# Patient Record
Sex: FEMALE | Race: WHITE | NOT HISPANIC OR LATINO | Employment: FULL TIME | URBAN - METROPOLITAN AREA
[De-identification: names, ages, dates, MRNs, and addresses within clinical notes are randomized per-mention and may not be internally consistent; named-entity substitution may affect disease eponyms.]

---

## 2017-01-31 ENCOUNTER — COMMUNICATION - HEALTHEAST (OUTPATIENT)
Dept: FAMILY MEDICINE | Facility: CLINIC | Age: 27
End: 2017-01-31

## 2017-04-24 ENCOUNTER — COMMUNICATION - HEALTHEAST (OUTPATIENT)
Dept: FAMILY MEDICINE | Facility: CLINIC | Age: 27
End: 2017-04-24

## 2017-06-12 ENCOUNTER — OFFICE VISIT - HEALTHEAST (OUTPATIENT)
Dept: FAMILY MEDICINE | Facility: CLINIC | Age: 27
End: 2017-06-12

## 2017-06-12 DIAGNOSIS — Z00.00 ROUTINE GENERAL MEDICAL EXAMINATION AT A HEALTH CARE FACILITY: ICD-10-CM

## 2017-06-12 DIAGNOSIS — S29.011A STRAIN OF CHEST WALL, INITIAL ENCOUNTER: ICD-10-CM

## 2017-06-12 ASSESSMENT — MIFFLIN-ST. JEOR: SCORE: 1376.21

## 2017-06-13 ENCOUNTER — COMMUNICATION - HEALTHEAST (OUTPATIENT)
Dept: FAMILY MEDICINE | Facility: CLINIC | Age: 27
End: 2017-06-13

## 2017-06-20 LAB
BKR LAB AP ABNORMAL BLEEDING: NO
BKR LAB AP BIRTH CONTROL/HORMONES: NORMAL
BKR LAB AP CERVICAL APPEARANCE: NORMAL
BKR LAB AP GYN ADEQUACY: NORMAL
BKR LAB AP GYN INTERPRETATION: NORMAL
BKR LAB AP HPV REFLEX: NORMAL
BKR LAB AP LMP: NORMAL
BKR LAB AP PATIENT STATUS: NORMAL
BKR LAB AP PREVIOUS ABNORMAL: NO
BKR LAB AP PREVIOUS NORMAL: 2014
HIGH RISK?: NO
PATH REPORT.COMMENTS IMP SPEC: NORMAL
RESULT FLAG (HE HISTORICAL CONVERSION): NORMAL

## 2017-06-30 ENCOUNTER — COMMUNICATION - HEALTHEAST (OUTPATIENT)
Dept: FAMILY MEDICINE | Facility: CLINIC | Age: 27
End: 2017-06-30

## 2017-11-08 ENCOUNTER — COMMUNICATION - HEALTHEAST (OUTPATIENT)
Dept: FAMILY MEDICINE | Facility: CLINIC | Age: 27
End: 2017-11-08

## 2017-11-30 ENCOUNTER — OFFICE VISIT - HEALTHEAST (OUTPATIENT)
Dept: FAMILY MEDICINE | Facility: CLINIC | Age: 27
End: 2017-11-30

## 2017-11-30 DIAGNOSIS — L29.0 ANAL ITCHING: ICD-10-CM

## 2018-01-09 ENCOUNTER — COMMUNICATION - HEALTHEAST (OUTPATIENT)
Dept: FAMILY MEDICINE | Facility: CLINIC | Age: 28
End: 2018-01-09

## 2018-01-29 ENCOUNTER — COMMUNICATION - HEALTHEAST (OUTPATIENT)
Dept: FAMILY MEDICINE | Facility: CLINIC | Age: 28
End: 2018-01-29

## 2018-02-06 ENCOUNTER — COMMUNICATION - HEALTHEAST (OUTPATIENT)
Dept: FAMILY MEDICINE | Facility: CLINIC | Age: 28
End: 2018-02-06

## 2018-02-12 ENCOUNTER — OFFICE VISIT - HEALTHEAST (OUTPATIENT)
Dept: FAMILY MEDICINE | Facility: CLINIC | Age: 28
End: 2018-02-12

## 2018-02-12 DIAGNOSIS — R39.89 SUSPECTED UTI: ICD-10-CM

## 2018-06-22 ENCOUNTER — COMMUNICATION - HEALTHEAST (OUTPATIENT)
Dept: FAMILY MEDICINE | Facility: CLINIC | Age: 28
End: 2018-06-22

## 2018-08-29 ENCOUNTER — OFFICE VISIT - HEALTHEAST (OUTPATIENT)
Dept: FAMILY MEDICINE | Facility: CLINIC | Age: 28
End: 2018-08-29

## 2018-08-29 DIAGNOSIS — Z30.8 ENCOUNTER FOR OTHER CONTRACEPTIVE MANAGEMENT: ICD-10-CM

## 2018-08-29 DIAGNOSIS — Z00.00 ROUTINE PHYSICAL EXAMINATION: ICD-10-CM

## 2018-08-29 ASSESSMENT — MIFFLIN-ST. JEOR: SCORE: 1284.47

## 2019-01-04 ENCOUNTER — OFFICE VISIT - HEALTHEAST (OUTPATIENT)
Dept: FAMILY MEDICINE | Facility: CLINIC | Age: 29
End: 2019-01-04

## 2019-01-04 DIAGNOSIS — R10.13 ABDOMINAL PAIN, EPIGASTRIC: ICD-10-CM

## 2019-01-04 ASSESSMENT — MIFFLIN-ST. JEOR: SCORE: 1308.06

## 2019-01-15 ENCOUNTER — COMMUNICATION - HEALTHEAST (OUTPATIENT)
Dept: FAMILY MEDICINE | Facility: CLINIC | Age: 29
End: 2019-01-15

## 2019-01-15 DIAGNOSIS — R14.0 FLATULENCE/GAS PAIN/BELCHING: ICD-10-CM

## 2019-01-15 DIAGNOSIS — R10.13 ABDOMINAL PAIN, EPIGASTRIC: ICD-10-CM

## 2019-02-12 ENCOUNTER — COMMUNICATION - HEALTHEAST (OUTPATIENT)
Dept: FAMILY MEDICINE | Facility: CLINIC | Age: 29
End: 2019-02-12

## 2019-03-05 ENCOUNTER — COMMUNICATION - HEALTHEAST (OUTPATIENT)
Dept: SCHEDULING | Facility: CLINIC | Age: 29
End: 2019-03-05

## 2019-12-02 ENCOUNTER — OFFICE VISIT - HEALTHEAST (OUTPATIENT)
Dept: FAMILY MEDICINE | Facility: CLINIC | Age: 29
End: 2019-12-02

## 2019-12-02 DIAGNOSIS — M25.562 LEFT KNEE PAIN, UNSPECIFIED CHRONICITY: ICD-10-CM

## 2019-12-02 DIAGNOSIS — Z00.00 ROUTINE GENERAL MEDICAL EXAMINATION AT A HEALTH CARE FACILITY: ICD-10-CM

## 2019-12-02 ASSESSMENT — MIFFLIN-ST. JEOR: SCORE: 1338.91

## 2020-01-27 ENCOUNTER — COMMUNICATION - HEALTHEAST (OUTPATIENT)
Dept: FAMILY MEDICINE | Facility: CLINIC | Age: 30
End: 2020-01-27

## 2020-01-27 DIAGNOSIS — M25.562 LEFT KNEE PAIN, UNSPECIFIED CHRONICITY: ICD-10-CM

## 2020-01-27 DIAGNOSIS — M25.552 HIP PAIN, LEFT: ICD-10-CM

## 2020-02-05 ENCOUNTER — OFFICE VISIT - HEALTHEAST (OUTPATIENT)
Dept: PHYSICAL THERAPY | Facility: REHABILITATION | Age: 30
End: 2020-02-05

## 2020-02-05 DIAGNOSIS — M25.562 LEFT LATERAL KNEE PAIN: ICD-10-CM

## 2020-02-05 DIAGNOSIS — M76.892 HIP FLEXOR TENDONITIS, LEFT: ICD-10-CM

## 2020-02-06 ENCOUNTER — COMMUNICATION - HEALTHEAST (OUTPATIENT)
Dept: FAMILY MEDICINE | Facility: CLINIC | Age: 30
End: 2020-02-06

## 2020-02-06 ENCOUNTER — COMMUNICATION - HEALTHEAST (OUTPATIENT)
Dept: PHYSICAL THERAPY | Facility: REHABILITATION | Age: 30
End: 2020-02-06

## 2020-02-14 ENCOUNTER — OFFICE VISIT - HEALTHEAST (OUTPATIENT)
Dept: PHYSICAL THERAPY | Facility: REHABILITATION | Age: 30
End: 2020-02-14

## 2020-02-14 DIAGNOSIS — M76.892 HIP FLEXOR TENDONITIS, LEFT: ICD-10-CM

## 2020-02-14 DIAGNOSIS — M25.562 LEFT LATERAL KNEE PAIN: ICD-10-CM

## 2020-02-14 DIAGNOSIS — F41.1 GENERALIZED ANXIETY DISORDER: ICD-10-CM

## 2020-02-14 DIAGNOSIS — N92.6 IRREGULAR MENSTRUAL CYCLE: ICD-10-CM

## 2020-02-21 ENCOUNTER — OFFICE VISIT - HEALTHEAST (OUTPATIENT)
Dept: PHYSICAL THERAPY | Facility: REHABILITATION | Age: 30
End: 2020-02-21

## 2020-02-21 DIAGNOSIS — M25.562 LEFT LATERAL KNEE PAIN: ICD-10-CM

## 2020-02-21 DIAGNOSIS — F41.1 GENERALIZED ANXIETY DISORDER: ICD-10-CM

## 2020-02-21 DIAGNOSIS — M76.892 HIP FLEXOR TENDONITIS, LEFT: ICD-10-CM

## 2020-02-28 ENCOUNTER — OFFICE VISIT - HEALTHEAST (OUTPATIENT)
Dept: PHYSICAL THERAPY | Facility: REHABILITATION | Age: 30
End: 2020-02-28

## 2020-02-28 DIAGNOSIS — F41.1 GENERALIZED ANXIETY DISORDER: ICD-10-CM

## 2020-02-28 DIAGNOSIS — M76.892 HIP FLEXOR TENDONITIS, LEFT: ICD-10-CM

## 2020-02-28 DIAGNOSIS — M25.562 LEFT LATERAL KNEE PAIN: ICD-10-CM

## 2020-03-06 ENCOUNTER — OFFICE VISIT - HEALTHEAST (OUTPATIENT)
Dept: PHYSICAL THERAPY | Facility: REHABILITATION | Age: 30
End: 2020-03-06

## 2020-03-06 DIAGNOSIS — M25.562 LEFT LATERAL KNEE PAIN: ICD-10-CM

## 2020-03-06 DIAGNOSIS — M76.892 HIP FLEXOR TENDONITIS, LEFT: ICD-10-CM

## 2020-03-09 ENCOUNTER — COMMUNICATION - HEALTHEAST (OUTPATIENT)
Dept: FAMILY MEDICINE | Facility: CLINIC | Age: 30
End: 2020-03-09

## 2020-03-09 DIAGNOSIS — N92.6 IRREGULAR MENSTRUAL CYCLE: ICD-10-CM

## 2020-12-15 ENCOUNTER — OFFICE VISIT (OUTPATIENT)
Dept: FAMILY MEDICINE | Facility: CLINIC | Age: 30
End: 2020-12-15
Payer: COMMERCIAL

## 2020-12-15 VITALS
OXYGEN SATURATION: 99 % | RESPIRATION RATE: 16 BRPM | HEART RATE: 120 BPM | HEIGHT: 62 IN | DIASTOLIC BLOOD PRESSURE: 85 MMHG | SYSTOLIC BLOOD PRESSURE: 131 MMHG | TEMPERATURE: 97.9 F | WEIGHT: 156.4 LBS | BODY MASS INDEX: 28.78 KG/M2

## 2020-12-15 DIAGNOSIS — Z32.01 PREGNANCY EXAMINATION OR TEST, POSITIVE RESULT: Primary | ICD-10-CM

## 2020-12-15 LAB — HCG UR QL: POSITIVE

## 2020-12-15 PROCEDURE — 99203 OFFICE O/P NEW LOW 30 MIN: CPT | Performed by: NURSE PRACTITIONER

## 2020-12-15 PROCEDURE — 81025 URINE PREGNANCY TEST: CPT | Performed by: NURSE PRACTITIONER

## 2020-12-15 ASSESSMENT — MIFFLIN-ST. JEOR: SCORE: 1384.36

## 2020-12-15 NOTE — PROGRESS NOTES
"Subjective     Myriam Flower is a 30 year old female who presents to clinic today for the following health issues:    HPI         Patient is here for a pregnancy test- current pregnancy symptoms- breast tenderness, dull cramping, acne, tired easily.  On prenatal vitamin.  No smoking or current ETOH use.   LMP: 11/7/2020  Test done at home : yes  Result: positive on  12/12/2020      Review of Systems   Constitutional, HEENT, cardiovascular, pulmonary, GI, , musculoskeletal, neuro, skin, endocrine and psych systems are negative, except as otherwise noted.      Objective    /85   Pulse 120   Temp 97.9  F (36.6  C) (Tympanic)   Resp 16   Ht 1.578 m (5' 2.11\")   Wt 70.9 kg (156 lb 6.4 oz)   LMP 11/07/2020 (Approximate)   SpO2 99%   Breastfeeding No   BMI 28.51 kg/m    Body mass index is 28.51 kg/m .  Physical Exam   GENERAL: healthy, alert and no distress  RESP: lungs clear to auscultation - no rales, rhonchi or wheezes  CV: regular rate and rhythm, normal S1 S2, no S3 or S4, no murmur, click or rub, no peripheral edema and peripheral pulses strong  PSYCH: mentation appears normal, affect normal/bright    See orders- UPT positive        Assessment & Plan     Myriam was seen today for pregnancy test.    Diagnoses and all orders for this visit:    Pregnancy examination or test, positive result  -     HCG Qual, Urine ([id number])  -     OB/GYN REFERRAL         BMI:   Estimated body mass index is 28.51 kg/m  as calculated from the following:    Height as of this encounter: 1.578 m (5' 2.11\").    Weight as of this encounter: 70.9 kg (156 lb 6.4 oz).   Weight management plan: Discussed healthy diet and exercise guidelines         See Patient Instructions: discussed schedule with OBGYN.  Follow up as needed for any health care questions or concerns.     Return in about 4 weeks (around 1/12/2021), or if symptoms worsen or fail to improve.    CATALINO Acosta  [facility]    "

## 2020-12-15 NOTE — PATIENT INSTRUCTIONS
Patient Education     Pregnancy    Your exam today shows that you are pregnant.  Pregnancy symptoms  During pregnancy your body s hormones change. This causes physical and emotional changes. This is normal. Knowing what to expect is important for your piece of mind and so you know when to seek help for a problem. Here are some of the most common symptoms:     Morning sickness or nausea. This can happen any time of the day or night.    Tender, swollen breasts    Need to urinate frequently    Tiredness or fatigue    Dizziness    Indigestion or heartburn    Food cravings or turn-offs    Constipation    Emotional changes. This can range from anxiety to excitement to depression.  General care for a healthy pregnancy  Here are things you can do to help make sure your baby is born healthy:    Rest when you feel tired. This is especially true in the later months of pregnancy.    Drink more fluids. Your body needs more fluids than you may be used to. Drink 8 to10 glasses of juice, milk, or water every day.    Eat well-balanced meals. Eat at regular times to give your body enough protein. You can expect to gain about 30 pounds during the pregnancy. Don t try to diet or lose weight while you are pregnant.    Take a prenatal vitamin every day. This helps you meet the extra nutritional needs of pregnancy.    Don t take any other medicine during your pregnancy unless your healthcare provider tells you to. This includes prescription medicines and those you buy over the counter. Many medicines can harm the growing baby.    If you have nausea or vomiting, don t eat greasy or fried foods. Eat several smaller meals throughout the day rather than 3 large meals.    If you smoke, you must stop. The nicotine you breathe in goes right to the baby.    Stay away from alcohol, even in moderate amounts. Daily drinking will harm your baby and can cause permanent brain damage.    Don t use recreational drugs, especially cocaine, crack, and  heroin. These will harm your baby. Also avoid marijuana.    If you were using recreational drugs or prescribed medicine when you found out that you were pregnant, talk with your healthcare provider about possible effects on your growing baby.    If you have medical problems that you need to take medicine for, talk with your healthcare provider.  Follow-up care  Call your healthcare provider to arrange for prenatal care. Prenatal care is important. You can see your family provider, a pregnancy specialist (obstetrician), a midwife, or a primary care clinic.   When to seek medical advice  Call your healthcare provider right away if any of these occur:    Vaginal bleeding    Pain in your belly (abdomen) or back that is moderate or severe    Lots of vomiting, or you can t keep any fluids down for 6 hours    Burning feeling when you urinate    Headache, dizziness, or rapid weight gain    Fever    Vision changes or blurred vision  Fred[employer] last reviewed this educational content on 11/1/2019 2000-2020 [email or web address]. [address] [place] [address], [place] [address]. All rights reserved. This information is not intended as a substitute for professional medical care. Always follow your healthcare professional's instructions.

## 2020-12-16 NOTE — RESULT ENCOUNTER NOTE
Results discussed directly with patient while patient was present. Any further details documented in the note.   Lu Cruz NP

## 2020-12-30 ENCOUNTER — MYC MEDICAL ADVICE (OUTPATIENT)
Dept: FAMILY MEDICINE | Facility: CLINIC | Age: 30
End: 2020-12-30

## 2021-01-04 ENCOUNTER — PRENATAL OFFICE VISIT (OUTPATIENT)
Dept: OBGYN | Facility: CLINIC | Age: 31
End: 2021-01-04
Payer: COMMERCIAL

## 2021-01-04 VITALS
WEIGHT: 157 LBS | HEIGHT: 63 IN | DIASTOLIC BLOOD PRESSURE: 83 MMHG | OXYGEN SATURATION: 100 % | HEART RATE: 96 BPM | SYSTOLIC BLOOD PRESSURE: 122 MMHG | BODY MASS INDEX: 27.82 KG/M2

## 2021-01-04 DIAGNOSIS — Z34.01 SUPERVISION OF NORMAL FIRST PREGNANCY IN FIRST TRIMESTER: Primary | ICD-10-CM

## 2021-01-04 DIAGNOSIS — Z12.4 SCREENING FOR CERVICAL CANCER: ICD-10-CM

## 2021-01-04 DIAGNOSIS — B37.31 YEAST INFECTION OF THE VAGINA: Primary | ICD-10-CM

## 2021-01-04 DIAGNOSIS — Z36.87 UNSURE OF LMP (LAST MENSTRUAL PERIOD) AS REASON FOR ULTRASOUND SCAN: ICD-10-CM

## 2021-01-04 DIAGNOSIS — N89.8 VAGINAL DISCHARGE: ICD-10-CM

## 2021-01-04 LAB
ABORH_EXT (HISTORICAL CONVERSION): NORMAL
ANTIBODY_EXT (HISTORICAL CONVERSION): NEGATIVE
ERYTHROCYTE [DISTWIDTH] IN BLOOD BY AUTOMATED COUNT: 11.3 % (ref 10–15)
HBSAG_EXT (HISTORICAL CONVERSION): NORMAL
HCT VFR BLD AUTO: 39.8 % (ref 35–47)
HGB BLD-MCNC: 14.2 G/DL (ref 11.7–15.7)
HGB_EXT (HISTORICAL CONVERSION): 14.2
HIV_EXT: NORMAL
MCH RBC QN AUTO: 30.8 PG (ref 26.5–33)
MCHC RBC AUTO-ENTMCNC: 35.7 G/DL (ref 31.5–36.5)
MCV RBC AUTO: 86 FL (ref 78–100)
PLATELET # BLD AUTO: 301 10E9/L (ref 150–450)
PLT_EXT - HISTORICAL: 301
RBC # BLD AUTO: 4.61 10E12/L (ref 3.8–5.2)
RPR - HISTORICAL: NORMAL
RUBELLA_EXT (HISTORICAL CONVERSION): NORMAL
SPECIMEN SOURCE: ABNORMAL
WBC # BLD AUTO: 13.5 10E9/L (ref 4–11)
WET PREP SPEC: ABNORMAL

## 2021-01-04 PROCEDURE — 87086 URINE CULTURE/COLONY COUNT: CPT | Performed by: OBSTETRICS & GYNECOLOGY

## 2021-01-04 PROCEDURE — 86901 BLOOD TYPING SEROLOGIC RH(D): CPT | Performed by: OBSTETRICS & GYNECOLOGY

## 2021-01-04 PROCEDURE — 99207 PR FIRST OB VISIT: CPT | Performed by: OBSTETRICS & GYNECOLOGY

## 2021-01-04 PROCEDURE — G0145 SCR C/V CYTO,THINLAYER,RESCR: HCPCS | Performed by: OBSTETRICS & GYNECOLOGY

## 2021-01-04 PROCEDURE — 86780 TREPONEMA PALLIDUM: CPT | Performed by: OBSTETRICS & GYNECOLOGY

## 2021-01-04 PROCEDURE — 87624 HPV HI-RISK TYP POOLED RSLT: CPT | Performed by: OBSTETRICS & GYNECOLOGY

## 2021-01-04 PROCEDURE — 36415 COLL VENOUS BLD VENIPUNCTURE: CPT | Performed by: OBSTETRICS & GYNECOLOGY

## 2021-01-04 PROCEDURE — 87340 HEPATITIS B SURFACE AG IA: CPT | Performed by: OBSTETRICS & GYNECOLOGY

## 2021-01-04 PROCEDURE — 86900 BLOOD TYPING SEROLOGIC ABO: CPT | Performed by: OBSTETRICS & GYNECOLOGY

## 2021-01-04 PROCEDURE — 85027 COMPLETE CBC AUTOMATED: CPT | Performed by: OBSTETRICS & GYNECOLOGY

## 2021-01-04 PROCEDURE — 99000 SPECIMEN HANDLING OFFICE-LAB: CPT | Performed by: OBSTETRICS & GYNECOLOGY

## 2021-01-04 PROCEDURE — 87210 SMEAR WET MOUNT SALINE/INK: CPT | Performed by: OBSTETRICS & GYNECOLOGY

## 2021-01-04 PROCEDURE — 87591 N.GONORRHOEAE DNA AMP PROB: CPT | Performed by: OBSTETRICS & GYNECOLOGY

## 2021-01-04 PROCEDURE — 86762 RUBELLA ANTIBODY: CPT | Performed by: OBSTETRICS & GYNECOLOGY

## 2021-01-04 PROCEDURE — 87389 HIV-1 AG W/HIV-1&-2 AB AG IA: CPT | Performed by: OBSTETRICS & GYNECOLOGY

## 2021-01-04 PROCEDURE — 87491 CHLMYD TRACH DNA AMP PROBE: CPT | Performed by: OBSTETRICS & GYNECOLOGY

## 2021-01-04 RX ORDER — TERCONAZOLE 0.4 %
1 CREAM WITH APPLICATOR VAGINAL AT BEDTIME
Qty: 1 G | Refills: 0 | Status: SHIPPED | OUTPATIENT
Start: 2021-01-04 | End: 2021-01-11

## 2021-01-04 ASSESSMENT — MIFFLIN-ST. JEOR: SCORE: 1393.34

## 2021-01-04 ASSESSMENT — PATIENT HEALTH QUESTIONNAIRE - PHQ9: SUM OF ALL RESPONSES TO PHQ QUESTIONS 1-9: 6

## 2021-01-04 NOTE — PATIENT INSTRUCTIONS
If you have any questions regarding your visit, Please contact your care team.    [facility]: [phone]      [facility] CLINIC HOURS TELEPHONE NUMBER   Keiry Busch .    LAURI Singh -  Stacey -       ANIRUDH Medina, RN  Pilar, RN     Monday, Wednesday, Thursday and Friday, [place]  8:30a.m-5:00 p.m   [facility]  [address] [place] [address]  [phone] ask for [facility]    Imaging [phone]       Urgent Care locations:    [facility] Saturday and Sunday   9 am - 5 pm    Monday-Friday   12 pm - 8 pm  Saturday and Sunday   9 am - 5 pm   [phone]     [facility] Labor and Delivery:  [phone]    If you need a medication refill, please contact your pharmacy. Please allow 3 business days for your refill to be completed.  As always, Thank you for trusting us with your healthcare needs!

## 2021-01-04 NOTE — PROGRESS NOTES
"Hali is a 30 year old female, ,who is here today for her first OB visit at 8 2/7 weeks gestation and is new to the  OB dept.  She has had a positive home pregnancy test.  She is not sure of her LMP - could be \"days\" off she states.  Therefore, will verify her EDC with an early OB US and she is comfortable with this plan.  She is due for a pap and consents to labs including a GC and chlamydia screen.  She has not experienced any nausea but has lost some of her appetite.  She denies any vaginal spotting or uterine cramping.  She quit use of all alcohol but had been adding rum to her egg nog before she knew that she was pregnant.  She denies any known alcohol exposure to this pregnancy, however.  She is taking a PNV daily po and her social hx is negative.     ROS: Ten point review of systems was reviewed and negative except the above.    Gyn Hx:      History reviewed. No pertinent past medical history.  History reviewed. No pertinent surgical history.  There is no problem list on file for this patient.      ALL/Meds: Her medication and allergy histories were reviewed and are documented in their appropriate chart areas.    SH: Reviewed and documented in the appropriate area of the chart.    FH: Her family history was reviewed and documented in its appropriate chart area.    PE: /83 (BP Location: Right arm, Cuff Size: Adult Regular)   Pulse 96   Ht 1.588 m (5' 2.5\")   Wt 71.2 kg (157 lb)   LMP 2020 (Approximate)   SpO2 100%   BMI 28.26 kg/m    Body mass index is 28.26 kg/m .    Urine HCG was +  Neck - supple without palpable mass  Hrt - RRR without murmur  Breasts - no palpable mass or nipple discharge but tender to palpation  Abd - soft, gravid, unable to hear fhts with the doppler, nontender  Pelvic - normal external female genitalia, normal vaginal mucosa, closed cervix without motion tenderness, gravid nontender uterus, no adnexal mass or tenderness  Ext - negative    A/P:   - I discussed " with her nutrition and medication concerns related to pregnancy.  We discussed folic acid supplementation.  We reviewed prenatal care.  She is given the opportunity to ask questions and have them answered.  Schedule an OB US and verify dates and fetal viability.  Check prenatal labwork.  She is to continue taking a PNV daily and avoid all alcohol.    30 minutes were spent face to face with the patient today discussing her history, diagnosis, and follow-up plan as noted above.  Over 50% of the visit was spent in counseling and coordination of care.  She is to return in 4 weeks or earlier prn.    Total Visit Time: 30 minutes.     Orders Placed This Encounter   Procedures     US OB <14 Weeks w Transvaginal Single     Pap imaged thin layer screen with HPV - recommended age 30 - 65 years (select HPV order below)     HPV High Risk Types DNA Cervical     CBC with platelets     Rubella Antibody IgG Quantitative     HIV Antigen Antibody Combo     Hepatitis B surface antigen     Treponema Abs w Reflex to RPR and Titer     ABO and Rh     Keiry Busch, DO  FACOG, FACS

## 2021-01-05 ENCOUNTER — ANCILLARY PROCEDURE (OUTPATIENT)
Dept: ULTRASOUND IMAGING | Facility: CLINIC | Age: 31
End: 2021-01-05
Attending: OBSTETRICS & GYNECOLOGY
Payer: COMMERCIAL

## 2021-01-05 DIAGNOSIS — Z36.87 UNSURE OF LMP (LAST MENSTRUAL PERIOD) AS REASON FOR ULTRASOUND SCAN: ICD-10-CM

## 2021-01-05 LAB
ABO + RH BLD: NORMAL
ABO + RH BLD: NORMAL
BACTERIA SPEC CULT: NO GROWTH
C TRACH DNA SPEC QL NAA+PROBE: NEGATIVE
HBV SURFACE AG SERPL QL IA: NONREACTIVE
HIV 1+2 AB+HIV1 P24 AG SERPL QL IA: NONREACTIVE
Lab: NORMAL
N GONORRHOEA DNA SPEC QL NAA+PROBE: NEGATIVE
RUBV IGG SERPL IA-ACNC: 102 IU/ML
SPECIMEN EXP DATE BLD: NORMAL
SPECIMEN SOURCE: NORMAL
T PALLIDUM AB SER QL: NONREACTIVE

## 2021-01-05 PROCEDURE — 76801 OB US < 14 WKS SINGLE FETUS: CPT | Performed by: RADIOLOGY

## 2021-01-06 LAB
COPATH REPORT: NORMAL
PAP: NORMAL

## 2021-01-07 LAB
FINAL DIAGNOSIS: NORMAL
HPV HR 12 DNA CVX QL NAA+PROBE: NEGATIVE
HPV16 DNA SPEC QL NAA+PROBE: NEGATIVE
HPV18 DNA SPEC QL NAA+PROBE: NEGATIVE
SPECIMEN DESCRIPTION: NORMAL
SPECIMEN SOURCE CVX/VAG CYTO: NORMAL

## 2021-01-15 ENCOUNTER — HEALTH MAINTENANCE LETTER (OUTPATIENT)
Age: 31
End: 2021-01-15

## 2021-02-01 ENCOUNTER — PRENATAL OFFICE VISIT (OUTPATIENT)
Dept: OBGYN | Facility: CLINIC | Age: 31
End: 2021-02-01
Payer: COMMERCIAL

## 2021-02-01 VITALS
SYSTOLIC BLOOD PRESSURE: 129 MMHG | DIASTOLIC BLOOD PRESSURE: 83 MMHG | OXYGEN SATURATION: 100 % | HEART RATE: 102 BPM | WEIGHT: 159.1 LBS | BODY MASS INDEX: 28.64 KG/M2

## 2021-02-01 DIAGNOSIS — Z34.02 ENCOUNTER FOR SUPERVISION OF NORMAL FIRST PREGNANCY IN SECOND TRIMESTER: ICD-10-CM

## 2021-02-01 DIAGNOSIS — Z23 NEED FOR PROPHYLACTIC VACCINATION AND INOCULATION AGAINST INFLUENZA: Primary | ICD-10-CM

## 2021-02-01 PROBLEM — Z34.00 SUPERVISION OF NORMAL FIRST PREGNANCY: Status: ACTIVE | Noted: 2021-02-01

## 2021-02-01 PROCEDURE — 99207 PR PRENATAL VISIT: CPT | Performed by: OBSTETRICS & GYNECOLOGY

## 2021-02-01 PROCEDURE — 90686 IIV4 VACC NO PRSV 0.5 ML IM: CPT | Performed by: OBSTETRICS & GYNECOLOGY

## 2021-02-01 PROCEDURE — 90471 IMMUNIZATION ADMIN: CPT | Performed by: OBSTETRICS & GYNECOLOGY

## 2021-02-01 NOTE — PROGRESS NOTES
Presents for routine  appointment.     No complaints aside from fatigue.    No abnormal discharge , no leaking fluid , no contractions , no vaginal bleeding    ROS:   and GI  negative.     Please see Prenatal Vitals and Notes Flowsheet for objective data.    A/P:  30 year old  at 12w2d       ICD-10-CM    1. Encounter for supervision of normal first pregnancy in second trimester  Z34.02        Genetic screening - undecided. Considering quad.    Follow up in 4 weeks.      Samantha Francis MD

## 2021-02-17 ENCOUNTER — MYC MEDICAL ADVICE (OUTPATIENT)
Dept: OBGYN | Facility: CLINIC | Age: 31
End: 2021-02-17

## 2021-03-02 ENCOUNTER — PRENATAL OFFICE VISIT (OUTPATIENT)
Dept: OBGYN | Facility: CLINIC | Age: 31
End: 2021-03-02
Payer: COMMERCIAL

## 2021-03-02 VITALS
OXYGEN SATURATION: 99 % | HEART RATE: 108 BPM | WEIGHT: 165 LBS | BODY MASS INDEX: 29.7 KG/M2 | SYSTOLIC BLOOD PRESSURE: 122 MMHG | DIASTOLIC BLOOD PRESSURE: 81 MMHG

## 2021-03-02 DIAGNOSIS — Z23 NEED FOR TDAP VACCINATION: ICD-10-CM

## 2021-03-02 DIAGNOSIS — Z34.02 ENCOUNTER FOR SUPERVISION OF NORMAL FIRST PREGNANCY IN SECOND TRIMESTER: Primary | ICD-10-CM

## 2021-03-02 PROCEDURE — 99207 PR PRENATAL VISIT: CPT | Performed by: ADVANCED PRACTICE MIDWIFE

## 2021-03-02 PROCEDURE — 36415 COLL VENOUS BLD VENIPUNCTURE: CPT | Performed by: ADVANCED PRACTICE MIDWIFE

## 2021-03-02 PROCEDURE — 81511 FTL CGEN ABNOR FOUR ANAL: CPT | Mod: 90 | Performed by: ADVANCED PRACTICE MIDWIFE

## 2021-03-02 PROCEDURE — 99000 SPECIMEN HANDLING OFFICE-LAB: CPT | Performed by: ADVANCED PRACTICE MIDWIFE

## 2021-03-02 NOTE — PROGRESS NOTES
Feeling well.  No complaints.   Will do quad test today.   Ultrasound ordered.   No contra/lof/vb  Encouraged classes.   Discussed COVID vaccine.   RTC 4 weeks.  Danelle Love, RADHA, CNM

## 2021-03-05 LAB
# FETUSES US: NORMAL
# FETUSES: NORMAL
AFP ADJ MOM AMN: 1.14
AFP SERPL-MCNC: 37 NG/ML
AGE - REPORTED: 30.9 YR
CURRENT SMOKER: NO
CURRENT SMOKER: NO
DIABETES STATUS PATIENT: NO
EGG DONOR AGE: NO
FAMILY MEMBER DISEASES HX: NO
FAMILY MEMBER DISEASES HX: NO
GA METHOD: NORMAL
GA METHOD: NORMAL
GA: NORMAL WK
HCG MOM SERPL: 1.19
HCG SERPL-ACNC: NORMAL IU/L
HX OF HEREDITARY DISORDERS: NORMAL
IDDM PATIENT QL: NO
INHIBIN A MOM SERPL: 1.14
INHIBIN A SERPL-MCNC: 156 PG/ML
INTEGRATED SCN PATIENT-IMP: NORMAL
IVF PREGNANCY: NO
LMP START DATE: NORMAL
MONOCHORIONIC TWINS: NO
PATHOLOGY STUDY: NORMAL
PREV FETUS DEFECT: NO
SERVICE CMNT-IMP: NO
SPECIMEN DRAWN SERPL: NORMAL
U ESTRIOL MOM SERPL: 0.54
U ESTRIOL SERPL-MCNC: 0.63 NG/ML
VALPROIC/CARBAMAZEPINE STATUS: NO
WEIGHT UNITS: NO

## 2021-04-05 ENCOUNTER — ANCILLARY PROCEDURE (OUTPATIENT)
Dept: ULTRASOUND IMAGING | Facility: CLINIC | Age: 31
End: 2021-04-05
Attending: ADVANCED PRACTICE MIDWIFE
Payer: COMMERCIAL

## 2021-04-05 DIAGNOSIS — Z34.02 ENCOUNTER FOR SUPERVISION OF NORMAL FIRST PREGNANCY IN SECOND TRIMESTER: ICD-10-CM

## 2021-04-05 PROCEDURE — 76805 OB US >/= 14 WKS SNGL FETUS: CPT | Performed by: RADIOLOGY

## 2021-04-06 ENCOUNTER — PRENATAL OFFICE VISIT (OUTPATIENT)
Dept: OBGYN | Facility: CLINIC | Age: 31
End: 2021-04-06
Payer: COMMERCIAL

## 2021-04-06 VITALS
DIASTOLIC BLOOD PRESSURE: 78 MMHG | BODY MASS INDEX: 31.14 KG/M2 | WEIGHT: 173 LBS | SYSTOLIC BLOOD PRESSURE: 127 MMHG | OXYGEN SATURATION: 97 % | HEART RATE: 98 BPM

## 2021-04-06 DIAGNOSIS — Z34.92 NORMAL PREGNANCY IN SECOND TRIMESTER: Primary | ICD-10-CM

## 2021-04-06 PROCEDURE — 99207 PR PRENATAL VISIT: CPT | Performed by: OBSTETRICS & GYNECOLOGY

## 2021-04-06 NOTE — PROGRESS NOTES
30 year old  at 21w3d weeks presents to the clinic for a routine prenatal visit.  No concerns.  No leakage of fluid, vaginal bleeding, or contractions   Good fetal movement.  Fundal height: 20cm  FHTs: 169  Normal anatomy ultrasound but some anatomy not well seen.  Repeat ultrasound ordered.  RTC 4 weeks    Gricelda Hoang DO

## 2021-04-07 NOTE — PATIENT INSTRUCTIONS
What to watch out for are: regular contractions every 5 min, vaginal bleeding, decreased fetal movement, or leakage of fluid.  Please call the office or go to L&D if you develop any of these signs and symptoms.      I will see you for your follow up appointment.  Please feel free to call if you have any questions or concerns.      Thanks,  Gricelda Hoang, DO

## 2021-04-27 ENCOUNTER — ANCILLARY PROCEDURE (OUTPATIENT)
Dept: ULTRASOUND IMAGING | Facility: CLINIC | Age: 31
End: 2021-04-27
Attending: OBSTETRICS & GYNECOLOGY
Payer: COMMERCIAL

## 2021-04-27 DIAGNOSIS — Z34.92 NORMAL PREGNANCY IN SECOND TRIMESTER: ICD-10-CM

## 2021-04-27 PROCEDURE — 76816 OB US FOLLOW-UP PER FETUS: CPT

## 2021-04-28 ENCOUNTER — TELEPHONE (OUTPATIENT)
Dept: OBGYN | Facility: CLINIC | Age: 31
End: 2021-04-28

## 2021-04-28 ENCOUNTER — MEDICAL CORRESPONDENCE (OUTPATIENT)
Dept: HEALTH INFORMATION MANAGEMENT | Facility: CLINIC | Age: 31
End: 2021-04-28

## 2021-04-28 NOTE — TELEPHONE ENCOUNTER
[facility] Call Center    Phone Message    May a detailed message be left on voicemail: yes     Reason for Call: Patient called returning call to the provider. Patient said that the best phone number to reach her at is [phone] and she will be available anytime. Please advise. Thank you.    Action Taken: Message routed to:  [facility] p [phone]    Travel Screening: Not Applicable

## 2021-04-28 NOTE — TELEPHONE ENCOUNTER
Will route to Dr. Hoang as it looks like she called pt to discuss US results.    Carol Fletcher RN

## 2021-04-28 NOTE — TELEPHONE ENCOUNTER
Please place an [facility] referral for this patient.  The indication is mild urinary tract dilatation of the left kidney.  Comprehensive ultrasound please.  Thanks!    ~Gricelda Hoang,

## 2021-05-03 ENCOUNTER — PRENATAL OFFICE VISIT (OUTPATIENT)
Dept: OBGYN | Facility: CLINIC | Age: 31
End: 2021-05-03
Payer: COMMERCIAL

## 2021-05-03 VITALS
HEART RATE: 108 BPM | SYSTOLIC BLOOD PRESSURE: 130 MMHG | BODY MASS INDEX: 32.43 KG/M2 | WEIGHT: 180.2 LBS | DIASTOLIC BLOOD PRESSURE: 85 MMHG

## 2021-05-03 DIAGNOSIS — Z34.03 ENCOUNTER FOR SUPERVISION OF NORMAL FIRST PREGNANCY IN THIRD TRIMESTER: Primary | ICD-10-CM

## 2021-05-03 DIAGNOSIS — Z34.92 NORMAL PREGNANCY IN SECOND TRIMESTER: ICD-10-CM

## 2021-05-03 LAB
GLUCOSE 1H P 50 G GLC PO SERPL-MCNC: 134 MG/DL (ref 60–129)
HGB BLD-MCNC: 12.6 G/DL (ref 11.7–15.7)

## 2021-05-03 PROCEDURE — 36415 COLL VENOUS BLD VENIPUNCTURE: CPT | Performed by: OBSTETRICS & GYNECOLOGY

## 2021-05-03 PROCEDURE — 82950 GLUCOSE TEST: CPT | Performed by: OBSTETRICS & GYNECOLOGY

## 2021-05-03 PROCEDURE — 99N1025 PR STATISTIC OBHBG - HEMOGLOBIN: Performed by: OBSTETRICS & GYNECOLOGY

## 2021-05-03 PROCEDURE — 99207 PR PRENATAL VISIT: CPT | Performed by: OBSTETRICS & GYNECOLOGY

## 2021-05-03 NOTE — PROGRESS NOTES
Presents for routine  appointment.     No complaints.    No abnormal discharge , no leaking fluid , no contractions , no vaginal bleeding    ROS:   and GI  negative.     Please see Prenatal Vitals and Notes Flowsheet for objective data.  Lab Results   Component Value Date    ABO A 2021    RH Pos 2021       A/P:  30 year old  at 25w2d       ICD-10-CM    1. Normal pregnancy in second trimester  Z34.92 Glucose tolerance gest screen 1 hour     OB hemoglobin       1 hr glucola today.  She does have access to xaitment.  She is Rh Positive   TDAP  next visit unless she gets the Covid vaccine  Discussed signs and symptoms of  labor  She has not heard from [facility] yet, so she will contact them to get scheduled.   Follow up in 4 weeks.      Samantha Francis MD

## 2021-05-13 DIAGNOSIS — Z34.03 ENCOUNTER FOR SUPERVISION OF NORMAL FIRST PREGNANCY IN THIRD TRIMESTER: ICD-10-CM

## 2021-05-13 LAB
GLUCOSE 1H P 100 G GLC PO SERPL-MCNC: 150 MG/DL (ref 60–179)
GLUCOSE 2H P 100 G GLC PO SERPL-MCNC: 117 MG/DL (ref 60–154)
GLUCOSE 3H P 100 G GLC PO SERPL-MCNC: 58 MG/DL (ref 60–139)
GLUCOSE P FAST SERPL-MCNC: 89 MG/DL (ref 60–94)

## 2021-05-13 PROCEDURE — 36415 COLL VENOUS BLD VENIPUNCTURE: CPT | Performed by: OBSTETRICS & GYNECOLOGY

## 2021-05-13 PROCEDURE — 82952 GTT-ADDED SAMPLES: CPT | Performed by: OBSTETRICS & GYNECOLOGY

## 2021-05-13 PROCEDURE — 82951 GLUCOSE TOLERANCE TEST (GTT): CPT | Performed by: OBSTETRICS & GYNECOLOGY

## 2021-05-14 ENCOUNTER — TRANSFERRED RECORDS (OUTPATIENT)
Dept: HEALTH INFORMATION MANAGEMENT | Facility: CLINIC | Age: 31
End: 2021-05-14

## 2021-05-14 ENCOUNTER — MYC MEDICAL ADVICE (OUTPATIENT)
Dept: OBGYN | Facility: CLINIC | Age: 31
End: 2021-05-14

## 2021-05-17 NOTE — TELEPHONE ENCOUNTER
I called and spoke to patient about her questions.  All questions answered and patient verbalizes understanding.    Gricelda Hoang, DO

## 2021-05-26 ENCOUNTER — RECORDS - HEALTHEAST (OUTPATIENT)
Dept: ADMINISTRATIVE | Facility: CLINIC | Age: 31
End: 2021-05-26

## 2021-05-27 ENCOUNTER — RECORDS - HEALTHEAST (OUTPATIENT)
Dept: ADMINISTRATIVE | Facility: CLINIC | Age: 31
End: 2021-05-27

## 2021-05-30 ENCOUNTER — RECORDS - HEALTHEAST (OUTPATIENT)
Dept: ADMINISTRATIVE | Facility: CLINIC | Age: 31
End: 2021-05-30

## 2021-05-31 VITALS — BODY MASS INDEX: 24.95 KG/M2 | WEIGHT: 137.5 LBS

## 2021-05-31 VITALS — HEIGHT: 62 IN | WEIGHT: 154.1 LBS | BODY MASS INDEX: 28.36 KG/M2

## 2021-06-01 ENCOUNTER — PRENATAL OFFICE VISIT (OUTPATIENT)
Dept: OBGYN | Facility: CLINIC | Age: 31
End: 2021-06-01
Payer: COMMERCIAL

## 2021-06-01 ENCOUNTER — RECORDS - HEALTHEAST (OUTPATIENT)
Dept: ADMINISTRATIVE | Facility: CLINIC | Age: 31
End: 2021-06-01

## 2021-06-01 VITALS
HEART RATE: 101 BPM | BODY MASS INDEX: 33.12 KG/M2 | SYSTOLIC BLOOD PRESSURE: 123 MMHG | OXYGEN SATURATION: 97 % | DIASTOLIC BLOOD PRESSURE: 85 MMHG | WEIGHT: 184 LBS

## 2021-06-01 DIAGNOSIS — Z34.03 ENCOUNTER FOR SUPERVISION OF NORMAL FIRST PREGNANCY IN THIRD TRIMESTER: Primary | ICD-10-CM

## 2021-06-01 DIAGNOSIS — Z23 NEED FOR TDAP VACCINATION: ICD-10-CM

## 2021-06-01 PROCEDURE — 86900 BLOOD TYPING SEROLOGIC ABO: CPT | Performed by: OBSTETRICS & GYNECOLOGY

## 2021-06-01 PROCEDURE — 90715 TDAP VACCINE 7 YRS/> IM: CPT | Performed by: OBSTETRICS & GYNECOLOGY

## 2021-06-01 PROCEDURE — 86901 BLOOD TYPING SEROLOGIC RH(D): CPT | Performed by: OBSTETRICS & GYNECOLOGY

## 2021-06-01 PROCEDURE — 90471 IMMUNIZATION ADMIN: CPT | Performed by: OBSTETRICS & GYNECOLOGY

## 2021-06-01 PROCEDURE — 36415 COLL VENOUS BLD VENIPUNCTURE: CPT | Performed by: OBSTETRICS & GYNECOLOGY

## 2021-06-01 PROCEDURE — 86850 RBC ANTIBODY SCREEN: CPT | Performed by: OBSTETRICS & GYNECOLOGY

## 2021-06-01 PROCEDURE — 99207 PR PRENATAL VISIT: CPT | Performed by: OBSTETRICS & GYNECOLOGY

## 2021-06-01 NOTE — PROGRESS NOTES
30 year old  at 29w3d weeks presents to the clinic for a routine prenatal visit.  No concerns.  No vaginal bleeding, leakage of fluid, or contractions   Good fetal movement.  Fundal height=30cm  OSDy=452  GCT=failed one hour but passed three hour  Hgb=12.6  Rh A+  RTC 2 weeks.  Renal pelvis dilation=repeat MFM ultrasound at 32 weeks  TDAP today    Gricelda Hoang DO

## 2021-06-01 NOTE — NURSING NOTE
Prior to immunization administration, verified patients identity using patient s name and date of birth. Please see Immunization Activity for additional information.     Screening Questionnaire for Adult Immunization    Are you sick today?   No   Do you have allergies to medications, food, a vaccine component or latex?   No   Have you ever had a serious reaction after receiving a vaccination?   No   Do you have a long-term health problem with heart, lung, kidney, or metabolic disease (e.g., diabetes), asthma, a blood disorder, no spleen, complement component deficiency, a cochlear implant, or a spinal fluid leak?  Are you on long-term aspirin therapy?   No   Do you have cancer, leukemia, HIV/AIDS, or any other immune system problem?   No   Do you have a parent, brother, or sister with an immune system problem?   No   In the past 3 months, have you taken medications that affect  your immune system, such as prednisone, other steroids, or anticancer drugs; drugs for the treatment of rheumatoid arthritis, Crohn s disease, or psoriasis; or have you had radiation treatments?   No   Have you had a seizure, or a brain or other nervous system problem?   No   During the past year, have you received a transfusion of blood or blood    products, or been given immune (gamma) globulin or antiviral drug?   No   For women: Are you pregnant or is there a chance you could become       pregnant during the next month?   No   Have you received any vaccinations in the past 4 weeks?   No     Immunization questionnaire answers were all negative.        Per orders of Dr. Hoang, injection of TDaP given by Beckie Pedro CMA. Patient instructed to remain in clinic for 15 minutes afterwards, and to report any adverse reaction to me immediately.       Screening performed by Beckie Pedro CMA on 6/1/2021 at 4:42 PM.

## 2021-06-02 VITALS — WEIGHT: 133 LBS | BODY MASS INDEX: 23.57 KG/M2 | HEIGHT: 63 IN

## 2021-06-02 VITALS — WEIGHT: 138.2 LBS | BODY MASS INDEX: 24.49 KG/M2 | HEIGHT: 63 IN

## 2021-06-02 LAB
ABO + RH BLD: NORMAL
ABO + RH BLD: NORMAL
BLD GP AB SCN SERPL QL: NORMAL
BLOOD BANK CMNT PATIENT-IMP: NORMAL
SPECIMEN EXP DATE BLD: NORMAL

## 2021-06-03 NOTE — PROGRESS NOTES
FEMALE ADULT PREVENTIVE EXAM      ASSESSMENT & PLAN  Hali was seen today for annual exam.    Diagnoses and all orders for this visit:    Routine general medical examination at a health care facility  Routine history and physical, updated in EMR. Gave her Tdap today. She's utd with pap smear, done 2017, repeat 2022. She will return at a later date for fasting lipid lab work. On birth control, no contraindication to OCPs per my review of her history today.   -     Cancel: Lipid [place] FASTING  -     Lipid [place] FASTING; Future    Left knee pain, unspecified chronicity  Patellofemoral syndrome based on exam and history. Discussed pathophysiology. Discussed PT but patient declined. Gave her handouts on knee stretches she can do to improve her pain. Advised cross training to help. RTC if not improved. No need for XR today based on my exam.     Other orders  -     Tdap vaccine,  8yo or older,  IM      General  Immunizations reviewed and updated ..    This note was created using Dragon dictation software, spelling errors may occur.    Alina Banuelos MD      CHIEF COMPLAINT:  Female preventive exam.    SUBJECTIVE:  Hali Teran is a 29 y.o. female without a significant past medical history who is here for annual physical examination. She is not fasting today so will come back at a later date for fasting lab work, namely fasting lipid. She recently had her lab work done for work up of palpitations in 3/2019 at [facility] which I reviewed and all normal so there's no need to repeat those lab works namely CMP. She needs tdap updated. She got  last week. She no longer has heart palpitations. She thought it was secondary to the stress from planning her wedding. Her other concern today is ongoing left knee pain, comes on only when she runs. She started running earlier this year, which she likes doing however left sided knee pain developed and stopped her from running. Pain is located in the  lateral aspect of her knee, comes on ONLY when she runs and goes away when she stops running. She denies fall or any previous injury to this knee. Knee pain doesn't radiate anywhere, dull achy pain. She denies back pain, ankle pain, numbness/tingling or weakness in her LEs. Also she recently got diagnosed with UTI and is currently on macrobid two times a day which she is tolerating well.     She last saw me Visit date not found.    She  has a past medical history of Fatigue.    Lab Results   Component Value Date    HGB 14.5 10/02/2014     Lab Results   Component Value Date    CHOL 210 (H) 10/02/2014    HDL 52 10/02/2014    LDLCALC 132 (H) 10/02/2014    TRIG 129 10/02/2014     Lab Results   Component Value Date    TSH 1.2 10/22/2012     BP Readings from Last 3 Encounters:   12/02/19 128/84   01/04/19 106/83   08/29/18 117/81       Surgeries:    Past Surgical History:   Procedure Laterality Date     WISDOM TOOTH EXTRACTION         Family History:  Her family history includes Breast cancer in her maternal grandmother; Lung cancer in her maternal grandfather; Ovarian cancer in an other family member; Skin cancer in her mother; Thyroid disease in her mother.    Social History:  She  reports that she is a non-smoker but has been exposed to tobacco smoke. She has never used smokeless tobacco. She reports current alcohol use of about 2.0 - 6.0 standard drinks of alcohol per week. She reports that she does not use drugs.    Medications:    Current Outpatient Medications:      levonorgestrel-ethinyl estradiol (NORDETTE) 0.15-0.03 mg per tablet, Take 1 tablet by mouth daily., Disp: 3 Package, Rfl: 4     nitrofurantoin, macrocrystal-monohydrate, (MACROBID) 100 MG capsule, Take 100 mg by mouth 2 (two) times a day., Disp: , Rfl:   HELD MEDICATIONS: None.    Allergies:  No latex allergies.  No Known Allergies    RISK BEHAVIOR & HEALTH HABITS  History of abnormal pap smear: none.  Date of previous pap smear: 2017 and normal, repeat  "2022.  Mammography: n/a.  Self Breast Exam: none.  Colon/Flex Sig: n/a.  DEXA: n/a.   Regular Exercise: yes.  Balanced diet: yes.  Seat Belt Use: yes.  Calcium intake/Osteoporosis prevention: no.  Women ([facility]) Daily Vit D Daily Calcium   19 to 50 years 600 1,000   51 to 70 years 600 1,200   > 70 years 800 1,200   Pregnant women     ?18 years 600 1,300   > 18 years 600 1,000   Breastfeeding women     ? 18 years 600 1,300   > 18 years 600 1,000     Sun Screen: YES  Dental Care: YES    REVIEW OF SYSTEMS:  Complete head to toe review of systems is otherwise negative except as above.    OBJECTIVE:  /84 (Patient Site: Left Arm, Patient Position: Sitting, Cuff Size: Adult Regular)   Pulse 92   Ht 5' 2.5\" (1.588 m)   Wt 145 lb (65.8 kg)   SpO2 98%   BMI 26.10 kg/m    Physical Exam:  General Appearance: Alert, cooperative, no distress, appears stated age  Head: Normocephalic, without obvious abnormality, atraumatic  Eyes: PERRL, conjunctiva/corneas clear, EOM's intact  Ears: Normal TM's and external ear canals, both ears  Nose: Nares normal, septum midline,mucosa normal, no drainage  Throat: Lips, mucosa, and tongue normal; teeth and gums normal  Neck: Supple, symmetrical, trachea midline, no adenopathy;  thyroid: not enlarged, symmetric, no tenderness/mass/nodules; no carotid bruit or JVD  Back: Symmetric, no curvature, ROM normal, no CVA tenderness  Lungs: Clear to auscultation bilaterally, respirations unlabored  Breasts: No breast masses, tenderness, asymmetry, or nipple discharge.  Heart: Regular rate and rhythm, S1 and S2 normal, no murmur, rub, or gallop,   Abdomen: Soft, non-tender, bowel sounds active all four quadrants,  no masses, no organomegaly  Pelvic:Normally developed genitalia with no external lesions or eruptions. Vagina and cervix show no lesions, inflammation, discharge or tenderness. No cystocele, No rectocele. Uterus normal in size and no uterine tenderness.  No adnexal mass " or tenderness.  Extremities: Extremities normal, atraumatic, no cyanosis or edema  Left Knee: Grossly normal in appearance. No overlying erythema or ecchymosis. No swelling noted. No tenderness to palpation over joint line. Full ROM with extension and flexion. Negative Lachman's and posterior drawer. No laxity with varus and valgus stress. Negative McMurrays. No patellar apprehension or pain with patellar compression. Pes anserine bursa nontender. CMS intact.  Skin: Skin color, texture, turgor normal, no rashes or lesions  Lymph nodes: Cervical, supraclavicular, and axillary nodes normal  Neurologic: Normal

## 2021-06-04 VITALS
HEIGHT: 63 IN | OXYGEN SATURATION: 98 % | DIASTOLIC BLOOD PRESSURE: 84 MMHG | BODY MASS INDEX: 25.69 KG/M2 | SYSTOLIC BLOOD PRESSURE: 128 MMHG | HEART RATE: 92 BPM | WEIGHT: 145 LBS

## 2021-06-05 NOTE — PROGRESS NOTES
[facility]   Knee and Hip Initial Evaluation    Patient Name: Hali Teran  Date of evaluation: 2/5/2020  Referral Diagnosis:   Left knee pain, unspecified chronicity [M25.562]  - Primary       Hip pain, left [M25.552]       Referring provider: Alina Banuelos MD **  Visit Diagnosis:     ICD-10-CM    1. Left lateral knee pain M25.562    2. Hip flexor tendonitis, left M76.892      Precautions: none    Assessment:   Pt is a 29 y.o. year old female with left lateral knee pain with running and left hip flexor pain with walking.  Patient has difficulty with running secondary to knee pain. Clinical findings include mild hip weakness as observed with manual muscle testing and single leg squat.  No pain reproduction during evaluation. Patient appears motivated to participate in Physical Therapy and present with a good Physical Therapy prognosis for resolution of activities limitations.        Pt. is appropriate for skilled PT intervention as outlined in the Plan of Care (POC).  Pt. is a good candidate for skilled PT services to improve pain levels and function.    Goals:  Pt. will demonstrate/verbalize independence in self-management of condition in : in other weeks  [place] Self Management Progress Towards Goal Other: 10 weeks   Pt. will be independent with home exercise program in : 6 weeks  Pt. will report decreased intensity, frequency of : in other weeks  Other Weeks:: 10 week pain in left knee     Pt will: run 3 miles with no pain >1/10 in left lateral knee to allow her to train for a 5K in 10 weeks.   Pt will: walk 1-2 miles without pain in left anterior hip in 10 weeks.       Patient's goals: to be able to run without pain     Goals and plan of care were set in collaboration with the patient.    Patient's expectations/goals are realistic.    Barriers to Learning or Achieving Goals:  No Barriers.       Plan / Patient Instructions:      Plan of Care:   Authorization / Certification Start  Date: 02/05/20  Authorization / Certification End Date: 04/15/20  Authorization / Certification Number of Visits: up to 10  Communication with: Referral Source  Patient Related Instruction: Nature of Condition;Treatment plan and rationale;Self Care instruction;Basis of treatment;Next steps;Expected outcome  Times per Week: 1  Number of Weeks: 10  Number of Visits: 10  Discharge Planning: met goals   Therapeutic Exercise: ROM;Stretching;Strengthening  Neuromuscular Reeducation: kinesio tape;balance/proprioception;core  Manual Therapy: soft tissue mobilization;myofascial release;joint mobilization  Equipment: theraband      Treatment techniques, plan of care, and goals were discussed with the patient. The patient agrees to the plan as outlined. The plan of care is dynamic and will be modified on an ongoing basis.  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 female who presents to therapy today with complaints of lateral left knee pain with running that started in October as well as left anterior hip pain.  Date of onset/duration of symptoms is October for knee pain. Onset was sudden. Symptoms are intermittent. She denies history of similar symptoms. She describes their previous level of function as not limited     Pt started running about 10 months ago.  Pt was on one run in October and lateral left knee started hurting.  Took a week off and pain started immediately when trying to run again.  Rested for 1 month.  Pain started after 0.5 miles.  Has not run since November.   Hip pain: notices with weight bearing.  Felt like she pulled something.  Started noticing before knee pain started.  Currently noticing hip pain daily in anterior/hip flexor area.     12/2/19:  [facility]: Her other concern today is ongoing left knee pain, comes on only when she runs. She started running earlier this year, which she likes doing however left sided knee pain developed and stopped her from running. Pain is located in the lateral aspect of her knee, comes on ONLY when she runs and goes away when she stops running.    Pain Ratin - hip pain with walking   Pain rating at best: 0  Pain rating at worst: 7 - knee pain with running   Pain description:lateral knee , anterior hip     Functional limitations are described as occurring with:   running      Goal: 5K     Patient reports benefit from:  no running      Exercise: Running schedule- was following malcolm   Started running in the Spring 2019  1-3 days a week  2-4 miles   7 runs in August  4 runs in September   Figure skate 1-2x a week   No weight lifting or other cross training.   Yoga     Objective:       Note: Items left blank indicates the item was not performed or not indicated at the time of the evaluation.    Patient Outcome Measures :    Lower Extremity Functional Scale (_/80): 6     Scores range from 0-80, where a score of 80 represents maximum function. The minimal clinically important difference is a positive change of 9 points.    Knee Examination  1. Left lateral knee pain     2. Hip flexor tendonitis, left       Precautions/Restrictions:  None  Involved Side: Left  Posture Observation:      General sitting posture is  normal.  Assistive Device: None  Gait Observation: normal - no hip pain  Lumbar Clearing: Does not provoke symptoms  Hip Clearing: Does not provoke symptoms    Knee ROM     Date:  20    AROM in degrees  Right   Left  Right   Left  Right   Left       Knee Flexion  (130 )   139   -2                   Knee Extension  (0 )   140   -2                 PROM in degrees  Right   Left  Right   Left  Right   Left       Knee Flexion  (130 )                         Knee Extension  (0 )                        LE Strength                  Date:  2/5/20   Strength (MMT/5)  Right   Left  Right   Left  Right   Left       Hip Flexion   4+   4+                   Hip Abduction   4+   4                   Hip Adduction                         Hip Extension                         Hip Internal Rotation   5   5                   Hip External Rotation   5   4                   Knee Extension   5   5                   Knee Flexion   5   5                   Ankle Dorsiflexion   5   5                   Ankle Plantarflexion   5   5                   Palpation:  No pain with palpation     Knee Special Tests (+/-):      Knee OA Cluster   Right   Left   Ligament Tests   Right   Left    1. > 49 y/o           Lachman          2. Stiffness > 30 min.           Anterior Drawer   -   -    3. Crepitus           Posterior Drawer   -   -    4. Bony tenderness           Posterior Sag          5. Bone enlargement           Valgus Stress         0 /30 degrees    -   -    6. No warmth to the touch           Varus Stress      0/30 degrees    -   -     Meniscal Tests   Right   Left    Other   Right    Left       Aniya's   -   -     Ely's             Joint line tenderness           Idania             Thessaly - standing            Ezekiel   -    -       Apley's - prone       Patellar grind - -      Bounce home   -   -    Patellar      apprehension    -   -   SL squat from stool: bilateral valgus and shakiness   Squat: mild anterior knee translation - cues needed to correct, mild knee valgus     Exercises:  Exercise #1: quad stretch with leg off table - hold ankle 30-60 seconds   Comment #1: squats 2x10-15 (with or without a chair)   Exercise #2: standing hip abdcution 2x10-15      Treatment Today     TREATMENT MINUTES COMMENTS   Evaluation 26 -knee pain  -educated on POC, diagnosis, relevant anatomy and basis for treatment.  Handouts provided for HEP.    Self-care/ Home management     Manual therapy     Neuromuscular Re-education     Therapeutic  Activity     Therapeutic Exercises 23 -see exercise flow sheet  Gave return to run sheet: start with 4 min walk, 2 min run 30 min total (1x before next session)      Gait training     Modality__________________                Total 49    Blank areas are intentional and mean the treatment did not include these items.     PT Evaluation Code: (Please list factors)  Patient History/Comorbidities: see above   Examination: LE  Clinical Presentation: stable   Clinical Decision Making: low    Patient History/  Comorbidities Examination  (body structures and functions, activity limitations, and/or participation restrictions) Clinical Presentation Clinical Decision Making (Complexity)   No documented Comorbidities or personal factors 1-2 Elements Stable and/or uncomplicated Low   1-2 documented comorbidities or personal factor 3 Elements Evolving clinical presentation with changing characteristics Moderate   3-4 documented comorbidities or personal factors 4 or more Unstable and unpredictable High                Chelly Garza, PT, DPT  2/5/2020  8:08 AM

## 2021-06-06 NOTE — PROGRESS NOTES
[facility] Daily Progress     Patient Name: Hali Teran  Date: 3/6/2020  Visit #: 5/10  PTA visit #:    Referral Diagnosis:   Left knee pain, unspecified chronicity [M25.562]  - Primary       Hip pain, left [M25.552]       Referring provider: Alina Banuelos MD **  Visit Diagnosis:     ICD-10-CM    1. Left lateral knee pain M25.562    2. Hip flexor tendonitis, left M76.892        Assessment:   From Evaluation: Pt is a 29 y.o. year old female with left lateral knee pain with running and left hip flexor pain with walking.  Patient has difficulty with running secondary to knee pain. Clinical findings include mild hip weakness as observed with manual muscle testing and single leg squat.  No pain reproduction during evaluation. Patient appears motivated to participate in Physical Therapy and present with a good Physical Therapy prognosis for resolution of activities limitations.     Patient demonstrates understanding/independence with home program.  Patient is benefitting from skilled physical therapy and is making steady progress toward functional goals.  Patient is appropriate to continue with skilled physical therapy intervention, as indicated by initial plan of care.    Goal Status: MET   Pt. will demonstrate/verbalize independence in self-management of condition in : in other weeks  [place] Self Management Progress Towards Goal Other: 10 weeks   Pt. will be independent with home exercise program in : 6 weeks  Pt. will report decreased intensity, frequency of : in other weeks  Other Weeks:: 10 week pain in left knee     Pt will: run 3 miles with no pain >1/10 in left lateral knee to allow her to train for a 5K in 10 weeks.   Pt will: walk 1-2 miles without pain in left anterior hip in 10 weeks.       Plan / Patient Education:     Pt will increase running to 2-3x a week and will return to PT within 30 days if symptoms return      Subjective:   Pt ran 3.5 miles outside with no pain.   Only  "run since last session.   Uping resistance on squats, hip abdcution with theraband   Exercises  2-3x this week   Objective:     Goal: 3x a week running outside or treadmill. Working up to 5k.  2-3 miles (3x a week)   Recommended strengthening 2-3x a week.   Discussed stretching post-run: pt demonstrated her current routine.     Exercises:  Exercise #1: quad stretch with leg off table - hold ankle 30-60 seconds   Comment #1: squats x10 - with OTB and 12# medicine ball - over head   Exercise #2: standing hip abdcuction 2x10-15 - progressed to OTB   Comment #2: SL squat with elevated surface 2x10 - discussed   Exercise #3: SL dead lift 2x10-15  Comment #3: bridge and bridge/leg extensions 2x10-15 - added bosu ball   Exercise #4: TA: 90/90 and bicycles 2 sets to fatigue - added contralateral arms   Comment #4: plank 2x30 seconds, side plank 2x20 seconds, modified plank as needed   Exercise #5: Modified side plank with hip abdcution 2x10   Comment #5: pigeon - 1-2 mintues  + quad stretch hold 20 seconds   Exercise #6: Therapeutic ball: plank, HS curls, bridge   Comment #6: Bosu ball: plank demonstrated with leg ext, abd, knee to chest       K-Tape  Prior to application skin was cleaned with alcohol wipe  2 strips were applied to medial and lateral left knee with mild stretch.  No stretch applied to 2\" ends of tape.   Pt was sitting in 90/90 during application.     Treatment Today     TREATMENT MINUTES COMMENTS   Evaluation     Self-care/ Home management     Manual therapy     Neuromuscular Re-education     Therapeutic Activity     Therapeutic Exercises 27 See above    Gait training  Running on treadmill - see above    Modality__________________                Total 27    Blank areas are intentional and mean the treatment did not include these items.       Chelly Garza, PT, DPT   3/6/2020     [facility] Discharge Summary  Patient Name: Hali Teran  Date: 4/8/2020  Referral Diagnosis:   Left " knee pain, unspecified chronicity [M25.562]  - Primary       Hip pain, left [M25.552]       Referring provider: Dr. Banuelos   Visit Diagnosis:   1. Left lateral knee pain     2. Hip flexor tendonitis, left         Goals: MET   Pt. will demonstrate/verbalize independence in self-management of condition in : in other weeks  [place] Self Management Progress Towards Goal Other: 10 weeks   Pt. will be independent with home exercise program in : 6 weeks  Pt. will report decreased intensity, frequency of : in other weeks  Other Weeks:: 10 week pain in left knee     Pt will: run 3 miles with no pain >1/10 in left lateral knee to allow her to train for a 5K in 10 weeks.   Pt will: walk 1-2 miles without pain in left anterior hip in 10 weeks.       Patient was seen for 5 visits from 2/5/20 to 3/6/20 with 1 missed appointments.  The patient met goals and has demonstrated understanding of and independence in the home program for self-care, and progression to next steps.  She will initiate contact if questions or concerns arise.    Therapy will be discontinued at this time.  The patient will need a new referral to resume.    Thank you for your referral.  Chelly Garza, PT, DPT   4/8/2020  7:48 AM

## 2021-06-06 NOTE — PROGRESS NOTES
[facility] Daily Progress     Patient Name: Hali Teran  Date: 2/28/2020  Visit #: 4/10  PTA visit #:    Referral Diagnosis:   Left knee pain, unspecified chronicity [M25.562]  - Primary       Hip pain, left [M25.552]       Referring provider: Alina Banuelos MD **  Visit Diagnosis:     ICD-10-CM    1. Left lateral knee pain M25.562    2. Hip flexor tendonitis, left M76.892    3. Generalized anxiety disorder F41.1        Assessment:   From Evaluation: Pt is a 29 y.o. year old female with left lateral knee pain with running and left hip flexor pain with walking.  Patient has difficulty with running secondary to knee pain. Clinical findings include mild hip weakness as observed with manual muscle testing and single leg squat.  No pain reproduction during evaluation. Patient appears motivated to participate in Physical Therapy and present with a good Physical Therapy prognosis for resolution of activities limitations.     Patient demonstrates understanding/independence with home program.  Patient is benefitting from skilled physical therapy and is making steady progress toward functional goals.  Patient is appropriate to continue with skilled physical therapy intervention, as indicated by initial plan of care.    Goal Status:  Pt. will demonstrate/verbalize independence in self-management of condition in : in other weeks  [place] Self Management Progress Towards Goal Other: 10 weeks   Pt. will be independent with home exercise program in : 6 weeks  Pt. will report decreased intensity, frequency of : in other weeks  Other Weeks:: 10 week pain in left knee     Pt will: run 3 miles with no pain >1/10 in left lateral knee to allow her to train for a 5K in 10 weeks.   Pt will: walk 1-2 miles without pain in left anterior hip in 10 weeks.       Plan / Patient Education:     Continue with initial plan of care.  Progress with home program as tolerated.     Plan for next session: review HEP: JESSE  "bridges, SL squat, planks, TA with arms, standing hip abd (add orange band)   Ask about running outside   Treadmill 5 min on 1 min off     Subjective:   Ran on treadmill 2x in the past week.  Left 4th toe starts to go numb after 15 minutes (has notices in the past).  Pain on knee started around 22 mintues with numbness in toe, walked 2 min, ran - pain didn't start up right away with running - pain (\"I can feel it\")  started again 4/10 - outside pain was a 7-8/10 pain  Exercises 2-3x this week. Downloaded [employer] malcolm.    No hip pain this week.   Has been doing yoga on her own.    Past session: Joined gym.  Ran on treadmill.  3 min walk, 3 min jogging - 33 min.  Last 5 min noticed sensation in outer knee, but not painful.  Pain Ratin  3 days to gym - biking, elliptical - no pain   Complaint with HEP - and yoga on her own. Yoga helps left hip. Left hip has not been bothering her with walking.   Objective:     Goal: 3x a week running outside or treadmill. Working up to 5k.  2-3 miles (3x a week)      Exercises:  Exercise #1: quad stretch with leg off table - hold ankle 30-60 seconds   Comment #1: squats x10   Exercise #2: standing hip abdcuction 2x10-15 - added YTB   Comment #2: SL squat with elevated surface 2x10   Exercise #3: SL dead lift 2x10-15  Comment #3: bridge and bridge/leg extensions 2x10-15   Exercise #4: TA: 90/90 and bicycles 2 sets to fatigue - added contralateral arms   Comment #4: plank 2x30 seconds, side plank 2x20 seconds, modified plank as needed   Exercise #5: Modified side plank with hip abdcution 2x10   Comment #5: pigeon - 1-2 mintues  + quad stretch hold 20 seconds     Recommended running 5 min on 1-2 min off (30 min) on treadmill.   Strengthening every other day or 2 days on 1 day off.   Stretching 5-10 min daily.   Trial running 5 min outside to assess pain     K-Tape  Prior to application skin was cleaned with alcohol wipe  2 strips were applied to medial and lateral left knee with mild " "stretch.  No stretch applied to 2\" ends of tape.   Pt was sitting in 90/90 during application.     Treatment Today     TREATMENT MINUTES COMMENTS   Evaluation     Self-care/ Home management     Manual therapy     Neuromuscular Re-education     Therapeutic Activity     Therapeutic Exercises 29 See above    Gait training  Running on treadmill - see above    Modality__________________                Total 29    Blank areas are intentional and mean the treatment did not include these items.       Chelly Garza, PT, DPT   2/28/2020  "

## 2021-06-06 NOTE — PROGRESS NOTES
[facility] Daily Progress     Patient Name: Hali Teran  Date: 2/21/2020  Visit #: 3/10  PTA visit #:    Referral Diagnosis:   Left knee pain, unspecified chronicity [M25.562]  - Primary       Hip pain, left [M25.552]       Referring provider: Alina Banuelos MD **  Visit Diagnosis:     ICD-10-CM    1. Left lateral knee pain M25.562    2. Hip flexor tendonitis, left M76.892    3. Generalized anxiety disorder F41.1          Assessment:   From Evaluation: Pt is a 29 y.o. year old female with left lateral knee pain with running and left hip flexor pain with walking.  Patient has difficulty with running secondary to knee pain. Clinical findings include mild hip weakness as observed with manual muscle testing and single leg squat.  No pain reproduction during evaluation. Patient appears motivated to participate in Physical Therapy and present with a good Physical Therapy prognosis for resolution of activities limitations.     Patient demonstrates understanding/independence with home program.  Patient is benefitting from skilled physical therapy and is making steady progress toward functional goals.  Patient is appropriate to continue with skilled physical therapy intervention, as indicated by initial plan of care.    Goal Status:  Pt. will demonstrate/verbalize independence in self-management of condition in : in other weeks  [place] Self Management Progress Towards Goal Other: 10 weeks   Pt. will be independent with home exercise program in : 6 weeks  Pt. will report decreased intensity, frequency of : in other weeks  Other Weeks:: 10 week pain in left knee     Pt will: run 3 miles with no pain >1/10 in left lateral knee to allow her to train for a 5K in 10 weeks.   Pt will: walk 1-2 miles without pain in left anterior hip in 10 weeks.       Plan / Patient Education:     Continue with initial plan of care.  Progress with home program as tolerated.     Plan for next session: review HEP, add  planks   Ask about running     Subjective:   Joined gym.  Ran on treadmill.  3 min walk, 3 min jogging - 33 min.  Last 5 min noticed sensation in outer knee, but not painful.   Pain Ratin  3 days to gym - biking, elliptical - no pain   Complaint with HEP - and yoga on her own. Yoga helps left hip. Left hip has not been bothering her with walking.   Objective:     Walk Run treadmill:   11 min total   Walk/Run   Metronome used.   No left lateral knee pain     Metronome: 162 - step per min - advised pt increase to 162 - set metronome at 81 for left step strike   152 - steps per min - normal altagracia     Goal: 3x a week running outside or treadmill. Working up to 5k.  2-3 miles a week.      Exercises:  Exercise #1: quad stretch with leg off table - hold ankle 30-60 seconds   Comment #1: squats x10   Exercise #2: standing hip abdcuction 2x10-15 - added YTB   Comment #2: SL squat with elevated surface 2x10   Exercise #3: SL dead lift 2x10-15  Comment #3: bridge and bridge/leg extensions 2x10-15     Recommended running 4 min on 3 min off (30 min).  If no pain repeat another day.   Otherwise 2 days of stationary bike or elliptical.   Strengthening every other day or 2 days on 1 day off.   Stretching 5-10 min daily.     Treatment Today     TREATMENT MINUTES COMMENTS   Evaluation     Self-care/ Home management     Manual therapy     Neuromuscular Re-education     Therapeutic Activity     Therapeutic Exercises 12 See above    Gait training 17 Running on treadmill - see above    Modality__________________                Total 29    Blank areas are intentional and mean the treatment did not include these items.       Chelly Garza, PT, DPT   2020

## 2021-06-06 NOTE — PROGRESS NOTES
[facility] Daily Progress     Patient Name: Hali Teran  Date: 2020  Visit #: 2/10  PTA visit #:    Referral Diagnosis:   Left knee pain, unspecified chronicity [M25.562]  - Primary       Hip pain, left [M25.552]       Referring provider: Alina Banuelos MD **    Visit Diagnosis:     ICD-10-CM    1. Left lateral knee pain M25.562    2. Hip flexor tendonitis, left M76.892          Assessment:   From Evaluation: Pt is a 29 y.o. year old female with left lateral knee pain with running and left hip flexor pain with walking.  Patient has difficulty with running secondary to knee pain. Clinical findings include mild hip weakness as observed with manual muscle testing and single leg squat.  No pain reproduction during evaluation. Patient appears motivated to participate in Physical Therapy and present with a good Physical Therapy prognosis for resolution of activities limitations.     Patient demonstrates understanding/independence with home program.  Patient is benefitting from skilled physical therapy and is making steady progress toward functional goals.  Patient is appropriate to continue with skilled physical therapy intervention, as indicated by initial plan of care.    Goal Status:  Pt. will demonstrate/verbalize independence in self-management of condition in : in other weeks  [place] Self Management Progress Towards Goal Other: 10 weeks   Pt. will be independent with home exercise program in : 6 weeks  Pt. will report decreased intensity, frequency of : in other weeks  Other Weeks:: 10 week pain in left knee     Pt will: run 3 miles with no pain >1/10 in left lateral knee to allow her to train for a 5K in 10 weeks.   Pt will: walk 1-2 miles without pain in left anterior hip in 10 weeks.       Plan / Patient Education:     Continue with initial plan of care.  Progress with home program as tolerated.     Plan for next session:     Subjective:     Pain Ratin   complaint with HEP  - and yoga on her own. Yoga helps left hip. Left hip has not been bothering her with walking. Can feel discomfort with standing with lateral hip shift to right.   Pt did 4 min of walking - 3 min running and pain started in lateral left knee.  Had to stop running due to pain. Pain lasted the rest of the day especially with stairs.   Objective:     Walk Run treadmill:   2 min walk at 3.0mph  2 min run at 5.0mph   1 min walk   3 min run   1 min walk  No left lateral knee pain     Exercises:  Exercise #1: quad stretch with leg off table - hold ankle 30-60 seconds   Comment #1: squats x10   Exercise #2: standing hip abdcuction 2x10-15 - added YTB   Comment #2: SL squat with elevated surface 2x10   Exercise #3: SL dead lift 2x10-15  Comment #3: bridge and bridge/leg extensions 2x10-15     Recommended joining gym to perform 30 min elliptical or stationary bike and start return to run program on treadmill. Stop running if she is experiencing any pain.     Treatment Today     TREATMENT MINUTES COMMENTS   Evaluation     Self-care/ Home management     Manual therapy     Neuromuscular Re-education     Therapeutic Activity     Therapeutic Exercises 20 See above    Gait training 8 Running on treadmill - see above    Modality__________________                Total     Blank areas are intentional and mean the treatment did not include these items.       Chelly Garza, PT, DPT   2/14/2020

## 2021-06-06 NOTE — TELEPHONE ENCOUNTER
Pt notified at 8:04 am, relayed Dr Banuelos's message. Pt asked to check where the Rx was sent, writer relayed that Rx was sent to [facility] in [place]- Pt requesting to have Rx sent to [facility]. Will call [facility] to cancel Rx and T up to be sent to [facility] and send to PCP for approval  Shelly Paredes, NICOLEA

## 2021-06-11 NOTE — PROGRESS NOTES
ASSESSMENT & PLAN:  1. Routine general medical examination at a health care facility  Immunizations reviewed and updated .  Alcohol use, safety and moderation discussed.  Recommended adequate calcium intake/osteoporosis prevention.  Discussed colon cancer screening at age 50, 45 if -American.  Diet and exercise reviewed, including goal of aerobic exercise 30-90 minutes most days of the week, moderation of portions sizes, avoiding eating out and fast food and increase in fruits and vegetables.  Discussed safe sex practices.  Discussed sun protection.  Discussed weight management.    - Gynecologic Cytology (PAP Smear)  - Chlamydia trachomatis & Neisseria gonorrhoeae, Amplified Detection    2. Contraception  Patient is happy with the birth control pill will continue it at this time.  Patient is in a stable relationship discussed that if they are considering having children she should start prenatal vitamins year before  - levonorgestrel-ethinyl estradiol (ORSYTHIA) 0.1-20 mg-mcg per tablet; TAKE 1 TABLET BY MOUTH DAILY.  Dispense: 84 tablet; Refill: 1    3. Strain of chest wall, initial encounter  Patient with occasional discomfort in the left axilla and left chest wall.  No masses were palpated.  Feel this is more of a muscular strain due to her workouts.  Patient is to monitor if any symptoms change will do imaging      Orders Placed This Encounter   Procedures     Chlamydia trachomatis & Neisseria gonorrhoeae, Amplified Detection     Order Specific Question:   Specimen Source?     Answer:   Endocervix     Medications Discontinued During This Encounter   Medication Reason     ORSYTHIA 0.1-20 mg-mcg per tablet Reorder       No Follow-up on file.    I have had an Advance Directives discussion with the patient.  The following high BMI interventions were performed this visit: encouragement to exercise and lifestyle education regarding diet    CHIEF COMPLAINT:  Chief Complaint   Patient presents with     Annual  Exam     lump in left arm pit area       HISTORY OF PRESENT ILLNESS:  Hali is a 26 y.o. female presenting to the clinic today for a physical examination.    Breast pain: She has a tender pain in her left armpit that occasionally presents in the left breast. She notes that the pain is intermittent and lasts for about one minute. She recalls frequently resting her head on her left arm while she is sitting at work and is unsure if that is related. She denies lumps, masses or drainage. She notes that the pain is not reproducible, and happens every few days for the past couple of months. She denies any new activity but has been going to the gym and lifting more.     Occular migraines: She notes her migraines are currently stable. She is not taking any medication. She believes they are related to stress.     REVIEW OF SYSTEMS:   She has occasional heart burn. She admits to light spotting two weeks before her period. All other systems are negative.    PFSH:  She works in HR customer service. Her maternal grandmother had breast cancer in her 60's.     Social History:  The patient reports that there is not domestic violence in her life.     Regular Exercise: no  Sunscreen Use: yes  Healthy Diet: yes, Whole 30 diet.   Dental Visits Regularly: yes  Sexually active: yes  Colonoscopy: no  Prevention of Osteoporosis: no   Last DXA: no    Social History     Social History     Marital status: Single     Spouse name: N/A     Number of children: N/A     Years of education: N/A     Occupational History     Not on file.     Social History Main Topics     Smoking status: Passive Smoke Exposure - Never Smoker     Smokeless tobacco: Not on file     Alcohol use Yes      Comment: 2-6     Drug use: No     Sexual activity: Not on file     Other Topics Concern     Not on file     Social History Narrative       History   Smoking Status     Passive Smoke Exposure - Never Smoker   Smokeless Tobacco     Not on file       Family History:  Family  "History   Problem Relation Age of Onset     Breast cancer Maternal Grandmother      Thyroid disease Mother      Ovarian cancer Other        Past Medical History:  Active Ambulatory Problems     Diagnosis Date Noted     Fatigue      Irregular Length Of Menstrual Periods      Resolved Ambulatory Problems     Diagnosis Date Noted     No Resolved Ambulatory Problems     No Additional Past Medical History       No Known Allergies    There is no immunization history for the selected administration types on file for this patient.  Immunization status: up to date and documented    Gynecologic History:  Patient's last menstrual period was 05/29/2017 (within weeks).  Contraception:oral contraceptives  Last Pap: 10/2/14. Results were: normal  Last mammogram: N/A.    OB History:  OB History     No data available          Surgical History:  Past Surgical History:   Procedure Laterality Date     wisdom sandy         VITALS:  Vitals:    06/12/17 1324   BP: 110/60   Pulse: 78   Weight: 154 lb 1.6 oz (69.9 kg)   Height: 5' 2.25\" (1.581 m)     Wt Readings from Last 3 Encounters:   06/12/17 154 lb 1.6 oz (69.9 kg)   04/09/15 150 lb 4 oz (68.2 kg)     Body mass index is 27.96 kg/(m^2).      PHYSICAL EXAM:  General Appearance: Reveals an alert, pleasant female.   Vitals:  Per nursing notes.  Head: Normocephalic, without obvious abnormality, atraumatic   Eyes: PERRL, conjunctiva/corneas clear, EOM's intact   Ears: Normal TM's and external ear canals, both ears   Oropharynx: Nares normal, septum midline, mucosa normal, no drainage, lips, and tongue normal   Neck: Supple, symmetrical, trachea midline, no adenopathy; thyroid: not enlarged, symmetric, no tenderness/mass/nodules   Back: Symmetric, no curvature, ROM normal, no CVA tenderness   Lungs: Clear to auscultation bilaterally, respirations unlabored, no wheezing or rales   Heart: Regular rate and rhythm, S1 and S2 normal, no murmur, rub, or gallop, no carotid bruits  Breasts: No breast " masses, tenderness, asymmetry, or nipple discharge. No pain with palpation.  The area that gets sore - is tail of taylor  Abdomen: Soft, non-tender, no masses, no organomegaly,  Pelvic: Normal-appearing female genitalia. No adnexal masses or cervical motion tenderness on bimanual exam.   Extremities: Extremities normal, atraumatic, no cyanosis or edema   Skin: Skin color, texture, turgor normal, no rashes or lesions   Lymph nodes: Cervical, supraclavicular, and axillary nodes normal.  Neurologic: Normal   Psych: Normal affect. Does not appear anxious or depressed.     DATA REVIEWED:  Additional history summarized (from old records or history from someone other than the patient or another healthcare provider) (2 TOTAL): Reviewed note from 4/15/16 regarding [facility] immunizations.    Decision to obtain extra information (old records requested or history from another person or accessing Care Everywhere) (1 TOTAL): Requested records from [facility].    Radiology tests summarized or ordered (XRAY/CT/MRI/DXA) (1 TOTAL): None.    Labs reviewed or ordered (1 TOTAL): reviewed labs    Medicine tests summarized or ordered (EKG/ECHO) (1 TOTAL): None.    Independent review of EKG or X-Ray (2 EACH): None.       The visit lasted a total of 32 minutes face to face with the patient. Over 50% of the time was spent conducting the physical.    I, Linsey Chu, am scribing for and in the presence of, Dr. Finnegan.    I, Dr. Finnegan, personally performed the services described in this documentation, as scribed by Linsey Chu in my presence, and it is both accurate and complete.    MEDICATIONS:  Current Outpatient Prescriptions   Medication Sig Dispense Refill     levonorgestrel-ethinyl estradiol (ORSYTHIA) 0.1-20 mg-mcg per tablet TAKE 1 TABLET BY MOUTH DAILY. 84 tablet 1     No current facility-administered medications for this visit.        Total Data Points: 4

## 2021-06-14 NOTE — PROGRESS NOTES
ASSESSMENT/ PLAN    1. Anal itching  27-year-old female without a significant past medical history who presented for anal itching is been going on for 1.5 months and are not getting better.  Exam shows irritated erythematous pruritic appearing rash around her anus but no other abnormality.  Most likely pruritus ani, could also have a yeast component.  Will treat with nystatin triamcinolone ointment to help with her symptoms.  Also recommended sitz bath 2-3 times a day and Benadryl at nighttime for symptomatic management.  I do not think there is any inflammatory skin condition or worrisome life-threatening condition causing her symptoms.  Unlikely parasitic infection.  I also advised patient to decrease her coffee tea intake to see if her symptoms get better.  Advised the patient to not use soap or wipe vigorously around the anus.  Return to clinic in a month if not improving.  Patient verbalized understanding.  - nystatin-triamcinolone (MYCOLOG) ointment; Apply topically 3 (three) times a day.  Dispense: 60 g; Refill: 0      Alina Banuelos MD        SUBJECTIVE   Haliana Teran is a 27 y.o. old female without a significant past medical history who presented to clinic today for further evaluation of anal itching.  Patient reports symptoms of been going on for 1.5 months, has been intermittent, worst at night, she has noticed some blood intermittently on toilet paper after bowel movement.  She denies any blood in her stool or pain with defecation or constipation.  She has not had this issue before. She denies fever/chills, N/V. She's been using wet wipes since her symptoms came on but this hasn't helped. She has not been using any over-the-counter medication to help with her symptoms.  She has not had this issue previously.  She is sexually active in a monogamous relationship and denies any history of STDs.  She does not practice anal sex.  She denies any national travel recently.  He denies any vaginal  symptoms.  She denies any urinary symptoms such as increased urinary frequency urgency, blood in urine, pain with urination, vaginal bleeding or itching.  He does drink some coffee for a long time but has not increased lately.  Denies any other change in her diet.  She denies any recent or family history of inflammatory disease.  She denies any other rash.  She denies any new medications.  She has been on oral birth control for a long time.      Review of Systems:  A 12 point comprehensive review of systems was negative except as noted in HPI.    The following portions of the patient's history were reviewed and updated as appropriate: past medical history, past surgical history, family history, allergies, current medications and problem list.    Medical History  Active Ambulatory (Non-Hospital) Problems    Diagnosis     Fatigue     Irregular Length Of Menstrual Periods     History reviewed. No pertinent past medical history.    Surgical History  She  has a past surgical history that includes wisdom sandy.    Social History  Reviewed, and she  reports that she is a non-smoker but has been exposed to tobacco smoke. She has never used smokeless tobacco. She reports that she drinks alcohol. She reports that she does not use illicit drugs.    Family History  Reviewed, and family history includes Breast cancer in her maternal grandmother; Ovarian cancer in her other; Thyroid disease in her mother.    Medications  Reviewed and reconciled.      Allergies  No Known Allergies      OBJECTIVE  Physical Exam:  Vital signs: /60 (Patient Site: Left Arm, Patient Position: Sitting, Cuff Size: Adult Regular)  Pulse 80  Temp 98  F (36.7  C) (Oral)   Resp 16  Wt 137 lb 8 oz (62.4 kg)  BMI 24.95 kg/m2  Weight: 137 lb 8 oz (62.4 kg)    General appearance: pleasant, appears stated age, cooperative and in no distress  Eyes: EOMs intact, PERRL, conjunctivae normal.   ENT: moist oral mucosa, posterior oropharynx normal  Lymph: no  cervical/supraclavicular adenopathy  Respiratory: clear to auscultation bilaterally, good air movement throughout, no wheezing or crackles, speaking full sentences without difficulty  Cardiovascular: regular rate and rhythm, no murmur appreciated, no leg edema  Abdomen: active bowel sounds, soft, non-tender, non-distended  : irritate erythematous rash around anus, no fissure or fistula appreciated. Did not perform GAUTAM.   Skin: no rashes otherwise.   Neuro: alert oriented x 3, grossly normal otherwise  Psych: normal affect, appropriate conversation

## 2021-06-15 ENCOUNTER — PRENATAL OFFICE VISIT (OUTPATIENT)
Dept: OBGYN | Facility: CLINIC | Age: 31
End: 2021-06-15
Payer: COMMERCIAL

## 2021-06-15 VITALS
DIASTOLIC BLOOD PRESSURE: 86 MMHG | WEIGHT: 188 LBS | HEART RATE: 98 BPM | SYSTOLIC BLOOD PRESSURE: 125 MMHG | BODY MASS INDEX: 33.84 KG/M2 | OXYGEN SATURATION: 100 %

## 2021-06-15 DIAGNOSIS — Z34.03 ENCOUNTER FOR SUPERVISION OF NORMAL FIRST PREGNANCY IN THIRD TRIMESTER: Primary | ICD-10-CM

## 2021-06-15 PROCEDURE — 99207 PR PRENATAL VISIT: CPT | Performed by: OBSTETRICS & GYNECOLOGY

## 2021-06-16 NOTE — PROGRESS NOTES
Assessment:   The encounter diagnosis was Suspected UTI.     Plan:     Medications Ordered   Medications     nitrofurantoin, macrocrystal-monohydrate, (MACROBID) 100 MG capsule     Sig: Take 1 capsule (100 mg total) by mouth 2 (two) times a day for 5 days.     Dispense:  10 capsule     Refill:  0     Patient Instructions     Based on the information that you have provided, I have placed an order for you to start treatment.  View your full visit summary for details. Click on the link below to access your visit summary.    Your pharmacist will address any questions you may have about taking the medication.  If you don't see improvement after 3 days of treatment I would recommend you come in for an appointment to discuss these symptoms. You are able to schedule an appointment within [facility] at your convenience.    If you do need to come in for this same symptom within the next seven days, your eVisit will be free of charge.      Return for further follow up if needed. Call [phone]) or schedule an appointment via [employer]..    Subjective:   Hali Theodoreawaismarciano is a 27 y.o. female who submitted an eVisit request for evaluation of her Dysuria.  See the questionnaire and message section of encounter report for information related to history of present illness and review of systems.    The following portions of the patient's history were reviewed and updated as appropriate:  She  does not have any pertinent problems on file.  She has No Known Allergies..     Objective:   No exam performed today, patient submitted as eVisit.

## 2021-06-16 NOTE — PATIENT INSTRUCTIONS
SIGNS OF  LABOR    Labor is  if it happens more than three weeks before your due date.    It can be hard to know if you are in labor, since the symptoms can be like the normal feelings of pregnancy.  Often, the only difference is the symptoms increase or they don't go away.     Signs of  labor can include:    Change in your vaginal discharge:  You will have more vaginal discharge when you are pregnant and it should be creamy white.  Call the clinic right away if your discharge has a foul odor, pink or bloody,  or if it becomes watery or is more than is normal for you during your pregnancy.    More than 5-6 contractions or tightenings per hour.  Contractions can feel like period cramps or bowel (gas or diarrhea) pain.  You will feel it in the lower part of your abdomen, in your back or as a pressure feeling in your bottom.  It is often regular, coming for 30 seconds or a minute and then going away, only to come back 5 or 10 minutes later. Some contractions are normal during pregnancy, but if you are feeling more than 5-6 in one hour, empty your bladder, then drink 16-24 ounces of water, eat a snack and lay down on your left side. Put your hand on your abdomen to count the contractions.  If after one hour of resting you have still had 5-6 contractions call your clinic.

## 2021-06-17 NOTE — PATIENT INSTRUCTIONS - HE
Patient Instructions by Alina Banuelos MD at 12/2/2019  6:10 PM     Author: Alina Banuelos MD Service: -- Author Type: Physician    Filed: 12/2/2019  6:55 PM Encounter Date: 12/2/2019 Status: Addendum    : Alina Banuelos MD (Physician)    Related Notes: Original Note by Alina Banuelos MD (Physician) filed at 12/2/2019  6:44 PM         Patient Education     Understanding Patellofemoral Syndrome    Patellofemoral syndrome is a condition that causes pain on the front of the knee. The large bones of the upper and lower leg meet at the knee. This joint also includes a small triangle-shaped bone that rests on top of the leg bones. This is the kneecap (patella). Patellofemoral refers to the patella and the thighbone (femur). These bones are surrounded by connective tissue and muscles. Patellofemoral pain is believed to come from stress on the tissues of and around the knee. It's sometimes called runner's knee or jumper's knee because it's common in people who take part in sports.   What causes patellofemoral syndrome?  No single cause for patellofemoral pain has been found. But many things are likely to contribute to this type of knee pain. These include:    Actions that put repeated stress on the knee, such as running and squatting    Overtraining at a sport    Weak hip or thigh muscles    Normal variations in the way body parts fit together    Poor form during activities that stress the knee, such as running    A fall or blow to the knee  Symptoms of patellofemoral syndrome  Pain is a common symptom. Its often on the front of the knee, but can be around the kneecap. Pain can occur at certain times, such as when you are:    Running    Sitting for a long time with your knees bent, such as at a movie    Walking up or down stairs    Squatting  Other symptoms may include:    A feeling of the knee catching or locking    A grinding or crackling noise in your knee  Treatment for patellofemoral  syndrome  Treatment focuses on reducing pain and avoiding further injury. Treatments may include:    Rest your leg. This gives your knee time to recover. You may need to avoid or change the activity that caused the problem, such as not running for a while.    Prescription or over-the-counter medicines. These help reduce inflammation, swelling, and pain. NSAIDs (nonsteroidal anti-inflammatory drugs) are the most common medicines used. Medicines may be prescribed or bought over the counter. They may be given as pills. Or they may be put on the skin as a gel, cream, or patch.    Cold packs. These help reduce pain.    Stretches and other exercises. These can improve balance, flexibility, and strength.    A shoe insert (orthotic). This can make your knee more stable.    Elastic tape or a brace. These can make your knee more stable.    Physical therapy. This may include exercises or other treatments.    Surgery. In rare cases, if other treatments dont relieve symptoms, you may need surgery.  Possible complications of patellofemoral syndrome  If you dont give your knee time to heal, symptoms may return or get worse. Follow your healthcare providers instructions on resting and treating your knee.  When to call your healthcare provider  Call your healthcare provider right away if you have any of these:    Fever of 100.4 F (38 C) or higher, or as directed by your provider    Chills    Pain that gets worse    Symptoms that dont get better, or get worse    New symptoms  Date Last Reviewed: 3/10/2016    3818-8730 [employer]. [address] [place] [address]. All rights reserved. This information is not intended as a substitute for professional medical care. Always follow your healthcare professional's instructions.           Patient Education     Quadriceps Stretch (Flexibility)    1. Stand up straight and hold onto a wall, sturdy chair, railing, or table with your right hand.  2. Bend your left leg at  the knee behind you, and grab your ankle with your left hand. Pull your left heel toward your buttocks. Dont arch your back.  3. Hold for 30 to 60 seconds. Repeat 2 times.  4. Switch legs and repeat.  Date Last Reviewed: 5/1/2016 2000-2019 [employer]. [address] [place] [id number]. All rights reserved. This information is not intended as a substitute for professional medical care. Always follow your healthcare professional's instructions.           Patient Education     Quadruped Hip Abduction (Strength)    These instructions are for your right leg. Switch sides for your left leg.  1. Get down on the floor on your hands and knees.  2. Lift your right leg up and out to the side. Keep the knee bent. Raise your leg as high as is comfortable. Hold for 5 seconds.  3. Slowly lower your leg back to the floor.  4. Repeat 10 times, or as instructed.  Date Last Reviewed: 5/1/2016 2000-2019 [employer]. [address] [place] [id number]. All rights reserved. This information is not intended as a substitute for professional medical care. Always follow your healthcare professional's instructions.

## 2021-06-18 NOTE — PATIENT INSTRUCTIONS - HE
Patient Instructions by Chelly Garza PT at 2/28/2020 10:00 AM     Author: Chelly Garza PT Service: -- Author Type: Physical Therapist    Filed: 2/28/2020 10:24 AM Encounter Date: 2/28/2020 Status: Signed    : Chelly Garza PT (Physical Therapist)        PLANK - 2 sets     While lying face down, lift your body up on your elbows and toes. Try and maintain a straight spine. Do not allow your hips or pelvis on either side to drop.     You may modify by planking on your knees      LATERAL PLANK    While lying on your side with your knees bent, lift your body up on your elbow. Try and maintain a straight spine.    You may modify be bending your knees.    PLANK LATERAL WITH HIP ABDUCTION    While lying on your side, lift your body up on your elbow and feet. Next, slowly raise up the top most leg upwards, then return. Try and maintain a straight spine the entire time.      DEAD BUG    While lying on your back with your knees bent, slowly raise up one foot and opposite arm.    Retrun to starting position and then repeat on the opposite side.     Keep your low back flat on the floor the entire time.      Biola Pose with quad stretch    Start in hands and knees. Bring your right knee forward toward your right hand placing your right foot as close to your left hand as you can.  Keep your back leg long and keep your pelvic bones aligned.  Keep right hand flat on floor, reach left arm up and back and grab hold of your left foot, as you bend the left knee.  Try to stay upright.

## 2021-06-18 NOTE — PATIENT INSTRUCTIONS - HE
"Patient Instructions by Chelly Garza PT at 2/14/2020  7:00 AM     Author: Chelly Garza PT Service: -- Author Type: Physical Therapist    Filed: 2/14/2020  7:23 AM Encounter Date: 2/14/2020 Status: Signed    : Chelly Garza PT (Physical Therapist)        SIT TO STAND - SINGLE LEG SQUAT - NO HANDS    Start by sitting in a chair. Next, using only one leg, raise up to standing without using your hands for support.      BRIDGING    While lying on your back, tighten your lower abdominals, squeeze your buttocks and then raise your buttocks off the floor/bed as creating a \"Bridge\" with your body.     .   BRIDGE WITH SINGLE-LEG EXTENSION          SINGLE LEG DEADLIFT    Stand and balance on one leg.     Next, lean forward towards touching the floor as you extend and lift your leg behind your body. Keep your spine straight and hinge at the hip.    Return to starting original position and repeat.             "

## 2021-06-18 NOTE — PATIENT INSTRUCTIONS - HE
Patient Instructions by Chelly Garza PT at 2/5/2020  7:30 AM     Author: Chelly Garza PT Service: -- Author Type: Physical Therapist    Filed: 2/5/2020  8:17 AM Encounter Date: 2/5/2020 Status: Signed    : Chelly Garza PT (Physical Therapist)        Fire Hydrant: use support of wall if needed.  Without or without band.                SQUATS    Stand with feet shoulder width apart and arms extended in front of you (touching a table or back of a chair for balance if necessary).  Slowly squat down as tolerated, being sure to keep the back flat.                 SQUATS - CHAIR TAP    1. Place a sturdy chair behind you as shown in the picture. Cross your hands across your chest.M    2. Squat down by bending your knees, and aim your hips to tap the edge of the chair as shown.    note: Try to tap the chair lightly. You should not be sitting down. Make sure your knees do not pass your toes.       Quad stretch off edge of bed    Lie down on your back with your leg off the edge of your bed.  Put a belt or leash around your ankle and use it to bend your knee back until a stretch is felt in the front of your thigh.

## 2021-06-18 NOTE — PATIENT INSTRUCTIONS - HE
Patient Instructions by Chelly Garza PT at 2/21/2020  7:00 AM     Author: Chelly Garza PT Service: -- Author Type: Physical Therapist    Filed: 2/21/2020  7:23 AM Encounter Date: 2/21/2020 Status: Signed    : Chelly Garza PT (Physical Therapist)        BRACE - BILATERAL BENT LEG LIFT    While lying on your back with your knees bent,  raise up both feet. Use your stomach muscles to keep your spine from moving.    BRACE - BICYCLE    While lying on your back with your knees bent,  raise up both feet and straighten one out in front of you. Then return the leg back and straighten the other. Use your stomach muscles to keep your spine from moving.

## 2021-06-18 NOTE — PATIENT INSTRUCTIONS - HE
Patient Instructions by Chelly Garza PT at 3/6/2020  7:00 AM     Author: Chelly Garza PT Service: -- Author Type: Physical Therapist    Filed: 3/6/2020  7:26 AM Encounter Date: 3/6/2020 Status: Signed    : Chelly Garza PT (Physical Therapist)       .   EXERCISE BALL - PLANK    While kneeling on the floor with an exercise ball in front of you, place your elbows and hands on the ball and lift your body up. Try and maintain a straight spine. Do not allow your hips or pelvis on either side to drop.            STABILITY BALL BRIDGING    Place ball on the floor. Lie down and place your feet on the ball. Start with your arms out like a cross and bridge up until your body is straight, keeping knees bent. Hold this for 5 seconds. As this exercise becomes easier, bring your arms in closer to your body and eventually cross them in front of you.    Repeat 10-15 times.          STABILITY BALL BRIDGING ROLLUPS    Place ball on the floor. Lie down and place your feet on the ball. Start with your arms out like a cross and bridge up until your body is straight, then roll the ball toward your buttock, then roll back out to straight position. As this exercise becomes easier, bring your arms in closer to your body and eventually cross them in front of you.    Repeat 10-15 times.          EXERCISE BALL - PLANK    While kneeling on the floor with an exercise ball in front of you, place your elbows and hands on the ball and lift your body up. Try and maintain a straight spine. Do not allow your hips or pelvis on either side to drop.

## 2021-06-20 NOTE — PROGRESS NOTES
Assessment:      Healthy female exam.      Plan:     1. Routine physical examination  Immunizations reviewed and updated .  Alcohol use, safety and moderation discussed.  Recommended adequate calcium intake/osteoporosis prevention.  Diet and exercise reviewed, including goal of aerobic exercise 30-90 minutes most days of the week, moderation of portions sizes, avoiding eating out and fast food and increase in fruits and vegetables.  Discussed safe sex practices.  Discussed sun protection.  Discussed weight management.      2. Encounter for other contraceptive management  Discussed with patient at length different birth control options.  We discussed combined hormonal options such as a different pill versus the patch versus the NuvaRing.  At this time she would like a different pill.  We also talked about progesterone only options.  She is aware of these and will let me know if she would like to change to 1 of them.    Subjective:      Hali Teran is a 27 y.o. female who presents for an annual exam. The patient is sexually active. The patient participates in regular exercise: yes. The patient reports that there is not domestic violence in her life.  Patient would like to discuss birth control options.  Patient has been on this birth control for a few years and has noted for the last 3 months her period has been starting 2 days early.  The only time it did not is when she missed pill and had to take 2 of them at one time.  Patient also notes that she has slightly decreased libido on this medication.  She is currently in a stable relationship and does not desire pregnancy for 2+ years.  Other than this patient has no concerns    Healthy Habits:   Regular Exercise: Yes  Sunscreen Use: Yes  Healthy Diet: Yes  Dental Visits Regularly: Yes  Seat Belt: Yes  Sexually active: Yes  Self Breast Exam Monthly:Yes  Hemoccults: N/A  Flex Sig: N/A  Colonoscopy: N/A  Lipid Profile: Yes and at work  Glucose Screen:  "Yes  Prevention of Osteoporosis: N/A  Last Dexa: N/A  Guns at Home:  N/A      Immunization History   Administered Date(s) Administered     Influenza, inj, historic,unspecified 11/08/2017     Immunization status: stated as current, but no records available.    No exam data present    Gynecologic History  Patient's last menstrual period was 08/18/2018.  Contraception: OCP (estrogen/progesterone)  Last Pap: 6 2017. Results were: normal        OB History   No data available       Current Outpatient Prescriptions   Medication Sig Dispense Refill     nystatin-triamcinolone (MYCOLOG) ointment Apply topically 3 (three) times a day. 60 g 0     levonorgestrel-ethinyl estradiol (NORDETTE) 0.15-0.03 mg per tablet Take 1 tablet by mouth daily. 3 Package 4     No current facility-administered medications for this visit.      No past medical history on file.  Past Surgical History:   Procedure Laterality Date     wisdom sandy       Review of patient's allergies indicates no known allergies.  Family History   Problem Relation Age of Onset     Breast cancer Maternal Grandmother      Thyroid disease Mother      Ovarian cancer Other      Social History     Social History     Marital status: Single     Spouse name: N/A     Number of children: N/A     Years of education: N/A     Occupational History     Not on file.     Social History Main Topics     Smoking status: Passive Smoke Exposure - Never Smoker     Smokeless tobacco: Never Used     Alcohol use Yes      Comment: 2-6     Drug use: No     Sexual activity: Yes     Birth control/ protection: Pill     Other Topics Concern     Not on file     Social History Narrative       Review of Systems  Review of Systems   All other systems reviewed and are negative.            Objective:         Vitals:    08/29/18 0831   BP: 117/81   Pulse: 83   Resp: 16   Temp: 97.8  F (36.6  C)   TempSrc: Oral   Weight: 133 lb (60.3 kg)   Height: 5' 2.5\" (1.588 m)     Body mass index is 23.94 " kg/(m^2).    Physical  Physical Exam   Constitutional: She appears well-developed and well-nourished.   HENT:   Right Ear: External ear normal.   Left Ear: External ear normal.   Nose: Nose normal.   Mouth/Throat: Oropharynx is clear and moist.   Eyes: Conjunctivae and EOM are normal. Pupils are equal, round, and reactive to light. Right eye exhibits no discharge. Left eye exhibits no discharge.   Neck: No thyromegaly present.   Cardiovascular: Normal rate, regular rhythm and normal heart sounds.    No murmur heard.  Pulmonary/Chest: Effort normal and breath sounds normal. Right breast exhibits no mass and no nipple discharge. Left breast exhibits no mass and no nipple discharge.   Abdominal: Soft. Bowel sounds are normal. She exhibits no distension and no mass. There is no tenderness. There is no rebound and no guarding.   Musculoskeletal: Normal range of motion.   No joint swelling or deformity.   Lymphadenopathy:     She has no cervical adenopathy.   Neurological: She is alert. She has normal reflexes.   Skin: Skin is warm and dry. No rash noted.   Psychiatric: She has a normal mood and affect.   Vitals reviewed.

## 2021-06-21 ENCOUNTER — TRANSFERRED RECORDS (OUTPATIENT)
Dept: HEALTH INFORMATION MANAGEMENT | Facility: CLINIC | Age: 31
End: 2021-06-21

## 2021-06-22 NOTE — PROGRESS NOTES
ASSESSMENT/ PLAN    1. Abdominal pain, epigastric  Going on for 10 days, only comes on with swallowing and belching. No other associated symptoms. Complete ROS done and negative. Has started taken Prilosec for the last 3 days without improvement seen yet. Exam unremarkable. VS normal. Most likely GERD, but could potentially be esophageal disorder (spasm, stricture?), unlikely malignancy. Advised patient to continue to take prilosex 20 mg daily in the AM 30 minutes before breakfast and also add on Zantac 150 mg two times a day and give this regimen at least 3-4 weeks, if not better she can see GI for further evaluation, likely to get an upper GI endoscopy for further evaluation. I don't think lab or imaging is necessary today given history and exam today. Patient agreed with plan.   - omeprazole (PRILOSEC) 20 MG capsule; Take 20 mg by mouth daily before breakfast.  - Ambulatory referral to Gastroenterology  - ranitidine (ZANTAC) 150 MG tablet; Take 1 tablet (150 mg total) by mouth 2 (two) times a day.  Dispense: 20 tablet; Refill: 1      Greater than 25 minutes was spent today with patient ,more than 50% of time was counseling and coordination of care on the above issues      This note was created using Dragon dictation software, spelling errors may occur.     Alnia Banuelos MD        SUBJECTIVE   Hali Teran is a 28 y.o. old female with a past medical history of GEOVANY and irregular periods on birth control who presented to clinic today for further evaluation of epigastric abdominal pain, exacerbated with belching and swallowing. Doesn't feel like heart burn as she has had heart burn before. This pain has been going on for 10 days. Pain is not worsening but not improving either. She's worried that something serious is going on. Pain is a mix between sharp and achy, lasting a few seconds and comes on only when she swallows and belches and goes away quickly. Pain doesn't radiate anywhere and just stay in the  "middle of her abdomen right below her breast bone. No pain like this before. Been taking prilosec for the last 3 days but hasn't noticed any improvement. She denies shortness of breath, cough, fever, chills, N/V, change in bowel/urine habits, blood in stool/urine, weight loss, lack of appetite, lymphadenopathy, rash or any other symptoms. She doesn't notice if any particular type of food brings on the pain. She doesn't drink alcohol. She doesn't take NSAIDS on a regular basis. No family history of GI issue. She denies increased belching or flatus recently. No change in her diet.       Review of Systems:  Negative except as noted in HPI    The following portions of the patient's history were reviewed and updated as appropriate: past medical history, past surgical history, family history, allergies, current medications and problem list.    Medical History  Active Ambulatory (Non-Hospital) Problems    Diagnosis     Fatigue     Irregular Length Of Menstrual Periods     Past Medical History:   Diagnosis Date     Fatigue        Surgical History  She  has a past surgical history that includes [place] tooth extraction.    Social History  Reviewed, and she  reports that she is a non-smoker but has been exposed to tobacco smoke. she has never used smokeless tobacco. She reports that she drinks about 1.2 - 3.6 oz of alcohol per week. She reports that she does not use drugs.     Family History  Reviewed, and family history includes Breast cancer in her maternal grandmother; Lung cancer in her maternal grandfather; Ovarian cancer in an other family member; Skin cancer in her mother; Thyroid disease in her mother.    Medications  Reviewed and reconciled    Allergies  No Known Allergies      OBJECTIVE  Physical Exam:  Vital signs: /83 (Patient Site: Left Arm, Patient Position: Sitting, Cuff Size: Adult Regular)   Pulse 98   Temp 98.3  F (36.8  C) (Oral)   Ht 5' 2.5\" (1.588 m)   Wt 138 lb 3.2 oz (62.7 kg)   LMP 12/05/2018 " (Approximate)   SpO2 98%   BMI 24.87 kg/m    Weight: 138 lb 3.2 oz (62.7 kg)    General appearance: pleasant, appears stated age, cooperative and in no distress  Eyes: EOMs intact, PERRL, conjunctivae normal.   ENT: moist oral mucosa, posterior oropharynx mildly erythematous, Thyroid normal to palpation, no lump or mass or tenderness  Lymph: no cervical/supraclavicular adenopathy  Respiratory: clear to auscultation bilaterally, good air movement throughout, no wheezing or crackles, speaking full sentences without difficulty  Cardiovascular: regular rate and rhythm, no murmur appreciated, no leg edema  Abdomen: active bowel sounds, soft, non-tender, non-distended, no hepatosplenomegaly  Skin: no rashes  Neuro: alert oriented x 3, grossly normal otherwise  Psych: normal affect, appropriate conversation

## 2021-06-24 NOTE — TELEPHONE ENCOUNTER
Pt called in states her heart is skipping the beat.  The symptom started a week ago.  The symptom is irregular and sometimes it come more often with in minutes and sometimes it is not.  Pt is driving right at this.  Pt states she it happened in the past it is not noticeable like this.  Pt states she is under a lot of stress and maybe that is the cause.  No history of heart disease.  No dizziness, no chest pain, no sweating, no difficulty breathing.  Pt states she is not pregnant.  Pt states the skipping beat happened more often with in a minutes but she didn't know how many time per minutes.  The disposition is to be seen with in 4 hours.  Pt agree to go to ER today.  Care advice given per protocol.  Patient agrees with care advice given.   Agreed to call back if he has additional symptoms or questions.      Anshul Valdez RN, [facility] Triage/Med Refill 3/5/2019 7:32 PM      Reason for Disposition    [1] Skipped or extra beat(s) AND [2] occurs 4 or more times per minute    Protocols used: HEART RATE AND HEARTBEAT [id number]

## 2021-06-29 ENCOUNTER — PRENATAL OFFICE VISIT (OUTPATIENT)
Dept: OBGYN | Facility: CLINIC | Age: 31
End: 2021-06-29
Payer: COMMERCIAL

## 2021-06-29 VITALS
WEIGHT: 194 LBS | HEART RATE: 104 BPM | BODY MASS INDEX: 34.92 KG/M2 | DIASTOLIC BLOOD PRESSURE: 85 MMHG | SYSTOLIC BLOOD PRESSURE: 121 MMHG | OXYGEN SATURATION: 99 %

## 2021-06-29 DIAGNOSIS — Z34.03 ENCOUNTER FOR SUPERVISION OF NORMAL FIRST PREGNANCY IN THIRD TRIMESTER: Primary | ICD-10-CM

## 2021-06-29 PROCEDURE — 99207 PR PRENATAL VISIT: CPT | Performed by: OBSTETRICS & GYNECOLOGY

## 2021-06-29 PROCEDURE — 59426 ANTEPARTUM CARE ONLY: CPT | Performed by: OBSTETRICS & GYNECOLOGY

## 2021-06-29 NOTE — PROGRESS NOTES
30 year old  at 33w3d weeks presents to the clinic for a routine prenatal visit.    No concerns.  Patient denies any vaginal bleeding, leakage of fluid, or contractions     Good fetal movement  Fundal height=34cm  FQNp=[id number]  Discussed labor precautions.  RTC 2 weeks    Gricelda Hoang DO

## 2021-07-03 NOTE — ADDENDUM NOTE
Addendum Note by Shelly Hancock CMA at 3/10/2020  8:09 AM     Author: Shelly Hacnock CMA Service: -- Author Type: Certified Medical Assistant    Filed: 3/10/2020  8:09 AM Encounter Date: 3/9/2020 Status: Signed    : Shelly Hancock CMA (Certified Medical Assistant)    Addended by: SHELLY HANCOCK on: 3/10/2020 08:09 AM        Modules accepted: Orders

## 2021-07-10 ENCOUNTER — TRANSFERRED RECORDS (OUTPATIENT)
Dept: HEALTH INFORMATION MANAGEMENT | Facility: CLINIC | Age: 31
End: 2021-07-10

## 2021-07-10 ENCOUNTER — COMMUNICATION - HEALTHEAST (OUTPATIENT)
Dept: SCHEDULING | Facility: CLINIC | Age: 31
End: 2021-07-10

## 2021-07-10 ENCOUNTER — NURSE TRIAGE (OUTPATIENT)
Dept: NURSING | Facility: CLINIC | Age: 31
End: 2021-07-10

## 2021-07-10 ENCOUNTER — SURGERY - HEALTHEAST (OUTPATIENT)
Dept: OBGYN | Facility: HOSPITAL | Age: 31
End: 2021-07-10

## 2021-07-10 ENCOUNTER — HOSPITAL ENCOUNTER (INPATIENT)
Dept: OBGYN | Facility: HOSPITAL | Age: 31
LOS: 4 days | Discharge: HOME | End: 2021-07-14
Attending: FAMILY MEDICINE | Admitting: OBSTETRICS & GYNECOLOGY
Payer: COMMERCIAL

## 2021-07-10 LAB
ABO/RH(D): NORMAL
ALBUMIN UR-MCNC: NEGATIVE G/DL
AMPHETAMINES UR QL SCN: NORMAL
ANTIBODY SCREEN: NEGATIVE
APPEARANCE UR: CLEAR
BACTERIA #/AREA URNS HPF: ABNORMAL /[HPF]
BARBITURATES UR QL: NORMAL
BENZODIAZ UR QL: NORMAL
BILIRUB UR QL STRIP: NEGATIVE
CANNABINOIDS UR QL SCN: NORMAL
COCAINE UR QL: NORMAL
COLOR UR AUTO: ABNORMAL
CREAT UR-MCNC: 47.1 MG/DL
GLUCOSE UR STRIP-MCNC: NEGATIVE MG/DL
HGB BLD-MCNC: 13.1 G/DL (ref 12–16)
HGB UR QL STRIP: ABNORMAL
KETONES UR STRIP-MCNC: ABNORMAL MG/DL
LEUKOCYTE ESTERASE UR QL STRIP: ABNORMAL
MUCOUS THREADS #/AREA URNS LPF: PRESENT LPF
NITRATE UR QL: NEGATIVE
OPIATES UR QL SCN: NORMAL
OXYCODONE UR QL: NORMAL
PCP UR QL SCN: NORMAL
PH UR STRIP: 6 [PH] (ref 5–8)
RBC URINE: 2 HPF
RUPTURE OF FETAL MEMBRANES BY ROM PLUS: POSITIVE
SARS-COV-2 PCR RESULT-HE - HISTORICAL: NEGATIVE
SP GR UR STRIP: 1.01 (ref 1–1.03)
SQUAMOUS EPITHELIAL: 3 /HPF
T PALLIDUM AB SER QL: NEGATIVE
UROBILINOGEN UR STRIP-ACNC: ABNORMAL
WBC URINE: 6 HPF

## 2021-07-10 PROCEDURE — 87653 STREP B DNA AMP PROBE: CPT

## 2021-07-10 PROCEDURE — 85018 HEMOGLOBIN: CPT

## 2021-07-10 PROCEDURE — 10S0XZZ REPOSITION PRODUCTS OF CONCEPTION, EXTERNAL APPROACH: ICD-10-PCS | Performed by: OBSTETRICS & GYNECOLOGY

## 2021-07-10 PROCEDURE — 81003 URINALYSIS AUTO W/O SCOPE: CPT

## 2021-07-10 PROCEDURE — 120N000001 HC R&B MED SURG/OB

## 2021-07-10 PROCEDURE — 84112 EVAL AMNIOTIC FLUID PROTEIN: CPT

## 2021-07-10 PROCEDURE — 36415 COLL VENOUS BLD VENIPUNCTURE: CPT

## 2021-07-10 PROCEDURE — 86900 BLOOD TYPING SEROLOGIC ABO: CPT

## 2021-07-10 PROCEDURE — 87086 URINE CULTURE/COLONY COUNT: CPT

## 2021-07-10 PROCEDURE — 80307 DRUG TEST PRSMV CHEM ANLYZR: CPT

## 2021-07-10 PROCEDURE — 86850 RBC ANTIBODY SCREEN: CPT

## 2021-07-10 PROCEDURE — 86780 TREPONEMA PALLIDUM: CPT

## 2021-07-10 PROCEDURE — 86901 BLOOD TYPING SEROLOGIC RH(D): CPT

## 2021-07-10 PROCEDURE — 999N001212 HC REV CODE 9999 CHARGE CONVERSION OPNP

## 2021-07-10 PROCEDURE — 88307 TISSUE EXAM BY PATHOLOGIST: CPT | Mod: TC

## 2021-07-10 RX ORDER — FENTANYL CITRATE 50 UG/ML
50-100 INJECTION, SOLUTION INTRAMUSCULAR; INTRAVENOUS
Status: DISCONTINUED | OUTPATIENT
Start: 2021-07-11 | End: 2021-07-10

## 2021-07-10 RX ORDER — MISOPROSTOL 200 UG/1
800 TABLET ORAL
Status: DISCONTINUED | OUTPATIENT
Start: 2021-07-11 | End: 2021-07-10

## 2021-07-10 RX ORDER — NALBUPHINE HYDROCHLORIDE 10 MG/ML
5 INJECTION, SOLUTION INTRAMUSCULAR; INTRAVENOUS; SUBCUTANEOUS EVERY 4 HOURS PRN
Status: DISCONTINUED | OUTPATIENT
Start: 2021-07-11 | End: 2021-07-14 | Stop reason: HOSPADM

## 2021-07-10 RX ORDER — NALOXONE HYDROCHLORIDE 0.4 MG/ML
0.4 INJECTION, SOLUTION INTRAMUSCULAR; INTRAVENOUS; SUBCUTANEOUS
Status: DISCONTINUED | OUTPATIENT
Start: 2021-07-11 | End: 2021-07-14 | Stop reason: HOSPADM

## 2021-07-10 RX ORDER — MORPHINE SULFATE 0.5 MG/ML
1.5 INJECTION, SOLUTION EPIDURAL; INTRATHECAL; INTRAVENOUS
Status: DISCONTINUED | OUTPATIENT
Start: 2021-07-11 | End: 2021-07-10

## 2021-07-10 RX ORDER — ACETAMINOPHEN 325 MG/1
650 TABLET ORAL EVERY 4 HOURS PRN
Status: DISCONTINUED | OUTPATIENT
Start: 2021-07-11 | End: 2021-07-10

## 2021-07-10 RX ORDER — FOLIC ACID, .BETA.-CAROTENE, ASCORBIC ACID, CHOLECALCIFEROL, .ALPHA.-TOCOPHEROL ACETATE, DL-, THIAMINE MONONITRATE, RIBOFLAVIN, NIACINAMIDE, PYRIDOXINE HYDROCHLORIDE, CYANOCOBALAMIN, CALCIUM PANTOTHENATE, CALCIUM CARBONATE, FERROUS FUMARATE, AND ZINC OXIDE 1; 1000; 100; 400; 30; 3; 3; 15; 20; 12; 7; 200; 29; 20 MG/1; [IU]/1; MG/1; [IU]/1; [IU]/1; MG/1; MG/1; MG/1; MG/1; UG/1; MG/1; MG/1; MG/1; MG/1
1 TABLET, CHEWABLE ORAL DAILY
COMMUNITY
End: 2023-04-03

## 2021-07-10 RX ORDER — MORPHINE SULFATE 0.5 MG/ML
0.15 INJECTION, SOLUTION EPIDURAL; INTRATHECAL; INTRAVENOUS
Status: DISCONTINUED | OUTPATIENT
Start: 2021-07-11 | End: 2021-07-10

## 2021-07-10 RX ORDER — BISACODYL 10 MG
10 SUPPOSITORY, RECTAL RECTAL DAILY PRN
Status: DISCONTINUED | OUTPATIENT
Start: 2021-07-11 | End: 2021-07-14 | Stop reason: HOSPADM

## 2021-07-10 RX ORDER — HALOPERIDOL 5 MG/ML
1 INJECTION INTRAMUSCULAR
Status: DISCONTINUED | OUTPATIENT
Start: 2021-07-11 | End: 2021-07-14 | Stop reason: HOSPADM

## 2021-07-10 RX ORDER — MODIFIED LANOLIN
OINTMENT (GRAM) TOPICAL
Status: DISCONTINUED | OUTPATIENT
Start: 2021-07-11 | End: 2021-07-14 | Stop reason: HOSPADM

## 2021-07-10 RX ORDER — AMOXICILLIN 250 MG
1 CAPSULE ORAL 2 TIMES DAILY
Status: DISCONTINUED | OUTPATIENT
Start: 2021-07-11 | End: 2021-07-14 | Stop reason: HOSPADM

## 2021-07-10 RX ORDER — OXYTOCIN/0.9 % SODIUM CHLORIDE 30/500 ML
95 PLASTIC BAG, INJECTION (ML) INTRAVENOUS CONTINUOUS
Status: DISCONTINUED | OUTPATIENT
Start: 2021-07-11 | End: 2021-07-14 | Stop reason: HOSPADM

## 2021-07-10 RX ORDER — SODIUM CHLORIDE, SODIUM LACTATE, POTASSIUM CHLORIDE, CALCIUM CHLORIDE 600; 310; 30; 20 MG/100ML; MG/100ML; MG/100ML; MG/100ML
INJECTION, SOLUTION INTRAVENOUS CONTINUOUS
Status: DISCONTINUED | OUTPATIENT
Start: 2021-07-11 | End: 2021-07-10

## 2021-07-10 RX ORDER — KETOROLAC TROMETHAMINE 30 MG/ML
30 INJECTION, SOLUTION INTRAMUSCULAR; INTRAVENOUS EVERY 6 HOURS
Status: DISPENSED | OUTPATIENT
Start: 2021-07-11 | End: 2021-07-13

## 2021-07-10 RX ORDER — SODIUM CHLORIDE, SODIUM LACTATE, POTASSIUM CHLORIDE, CALCIUM CHLORIDE 600; 310; 30; 20 MG/100ML; MG/100ML; MG/100ML; MG/100ML
INJECTION, SOLUTION INTRAVENOUS CONTINUOUS PRN
Status: DISCONTINUED | OUTPATIENT
Start: 2021-07-11 | End: 2021-07-10

## 2021-07-10 RX ORDER — OXYTOCIN/0.9 % SODIUM CHLORIDE 30/500 ML
500 PLASTIC BAG, INJECTION (ML) INTRAVENOUS
Status: DISCONTINUED | OUTPATIENT
Start: 2021-07-11 | End: 2021-07-10

## 2021-07-10 RX ORDER — AMOXICILLIN 250 MG
2 CAPSULE ORAL 2 TIMES DAILY
Status: DISCONTINUED | OUTPATIENT
Start: 2021-07-11 | End: 2021-07-14 | Stop reason: HOSPADM

## 2021-07-10 RX ORDER — NALOXONE HYDROCHLORIDE 0.4 MG/ML
0.2 INJECTION, SOLUTION INTRAMUSCULAR; INTRAVENOUS; SUBCUTANEOUS
Status: DISCONTINUED | OUTPATIENT
Start: 2021-07-11 | End: 2021-07-14 | Stop reason: HOSPADM

## 2021-07-10 RX ORDER — MAGNESIUM HYDROXIDE/ALUMINUM HYDROXICE/SIMETHICONE 120; 1200; 1200 MG/30ML; MG/30ML; MG/30ML
30 SUSPENSION ORAL EVERY 4 HOURS PRN
Status: DISCONTINUED | OUTPATIENT
Start: 2021-07-11 | End: 2021-07-14 | Stop reason: HOSPADM

## 2021-07-10 RX ORDER — HYDROXYZINE HYDROCHLORIDE 50 MG/1
50 TABLET, FILM COATED ORAL
Status: DISCONTINUED | OUTPATIENT
Start: 2021-07-11 | End: 2021-07-14 | Stop reason: HOSPADM

## 2021-07-10 RX ORDER — KETOROLAC TROMETHAMINE 30 MG/ML
30 INJECTION, SOLUTION INTRAMUSCULAR; INTRAVENOUS
Status: ACTIVE | OUTPATIENT
Start: 2021-07-11 | End: 2021-07-12

## 2021-07-10 RX ORDER — LIDOCAINE HYDROCHLORIDE 10 MG/ML
1-30 INJECTION, SOLUTION EPIDURAL; INFILTRATION; INTRACAUDAL; PERINEURAL
Status: DISCONTINUED | OUTPATIENT
Start: 2021-07-11 | End: 2021-07-10

## 2021-07-10 RX ORDER — OXYCODONE HYDROCHLORIDE 5 MG/1
5 TABLET ORAL EVERY 4 HOURS PRN
Status: DISCONTINUED | OUTPATIENT
Start: 2021-07-11 | End: 2021-07-14 | Stop reason: HOSPADM

## 2021-07-10 RX ORDER — DEXTROSE, SODIUM CHLORIDE, SODIUM LACTATE, POTASSIUM CHLORIDE, AND CALCIUM CHLORIDE 5; .6; .31; .03; .02 G/100ML; G/100ML; G/100ML; G/100ML; G/100ML
INJECTION, SOLUTION INTRAVENOUS CONTINUOUS PRN
Status: DISCONTINUED | OUTPATIENT
Start: 2021-07-11 | End: 2021-07-14 | Stop reason: HOSPADM

## 2021-07-10 RX ORDER — BETAMETHASONE SODIUM PHOSPHATE AND BETAMETHASONE ACETATE 3; 3 MG/ML; MG/ML
12 INJECTION, SUSPENSION INTRA-ARTICULAR; INTRALESIONAL; INTRAMUSCULAR; SOFT TISSUE EVERY 24 HOURS
Status: DISCONTINUED | OUTPATIENT
Start: 2021-07-11 | End: 2021-07-10

## 2021-07-10 RX ORDER — MINERAL OIL
OIL (ML) MISCELLANEOUS PRN
Status: DISCONTINUED | OUTPATIENT
Start: 2021-07-11 | End: 2021-07-10

## 2021-07-10 RX ORDER — ONDANSETRON 2 MG/ML
4 INJECTION INTRAMUSCULAR; INTRAVENOUS EVERY 8 HOURS PRN
Status: DISCONTINUED | OUTPATIENT
Start: 2021-07-11 | End: 2021-07-14 | Stop reason: HOSPADM

## 2021-07-10 RX ORDER — ACYCLOVIR 200 MG/1
.1-.3 CAPSULE ORAL PRN
Status: DISCONTINUED | OUTPATIENT
Start: 2021-07-11 | End: 2021-07-10

## 2021-07-10 RX ORDER — IBUPROFEN 800 MG/1
800 TABLET, FILM COATED ORAL EVERY 6 HOURS
Status: DISCONTINUED | OUTPATIENT
Start: 2021-07-11 | End: 2021-07-14 | Stop reason: HOSPADM

## 2021-07-10 RX ORDER — IBUPROFEN 600 MG/1
600 TABLET, FILM COATED ORAL
Status: DISCONTINUED | OUTPATIENT
Start: 2021-07-11 | End: 2021-07-10

## 2021-07-10 RX ORDER — METHYLERGONOVINE MALEATE 0.2 MG/ML
200 INJECTION INTRAVENOUS
Status: DISCONTINUED | OUTPATIENT
Start: 2021-07-11 | End: 2021-07-10

## 2021-07-10 RX ORDER — CALCIUM CARBONATE 500 MG/1
500 TABLET, CHEWABLE ORAL
Status: DISCONTINUED | OUTPATIENT
Start: 2021-07-11 | End: 2021-07-14 | Stop reason: HOSPADM

## 2021-07-10 RX ORDER — ACETAMINOPHEN 650 MG/1
650 SUPPOSITORY RECTAL EVERY 4 HOURS PRN
Status: DISCONTINUED | OUTPATIENT
Start: 2021-07-11 | End: 2021-07-10

## 2021-07-10 RX ORDER — ACETAMINOPHEN 325 MG/1
975 TABLET ORAL EVERY 6 HOURS
Status: DISCONTINUED | OUTPATIENT
Start: 2021-07-11 | End: 2021-07-14 | Stop reason: HOSPADM

## 2021-07-10 RX ORDER — ONDANSETRON 4 MG/1
4 TABLET, ORALLY DISINTEGRATING ORAL EVERY 8 HOURS PRN
Status: DISCONTINUED | OUTPATIENT
Start: 2021-07-11 | End: 2021-07-14 | Stop reason: HOSPADM

## 2021-07-10 RX ORDER — OXYTOCIN 10 [USP'U]/ML
10 INJECTION, SOLUTION INTRAMUSCULAR; INTRAVENOUS
Status: DISCONTINUED | OUTPATIENT
Start: 2021-07-11 | End: 2021-07-10

## 2021-07-10 RX ORDER — CARBOPROST TROMETHAMINE 250 UG/ML
250 INJECTION, SOLUTION INTRAMUSCULAR
Status: DISCONTINUED | OUTPATIENT
Start: 2021-07-11 | End: 2021-07-10

## 2021-07-10 ASSESSMENT — MIFFLIN-ST. JEOR
SCORE: 1565.7
SCORE: 1561.73
SCORE: 1561.73

## 2021-07-10 NOTE — PROGRESS NOTES
Progress Notes by Colette Pappas, RN at 7/10/2021 12:49 PM     Author: Colette Pappas, RN Service: -- Author Type: Registered Nurse    Filed: 7/10/2021 12:52 PM Date of Service: 7/10/2021 12:49 PM Status: Signed    : Colette Pappas RN (Registered Nurse)       Dr. Kerr performed bedside US and fetus is transverse.  Dr. Kerr discussed risks and benefits of options moving forwards, including ECV with out without and epidural, and a c/s.  Patient would like to discuss with her family and will decide.  All questions answered at this time.  Planning to be NPO until further notice.  Orders received for type and screen from Dr. Kerr.

## 2021-07-10 NOTE — PROGRESS NOTES
"Progress Notes by Carlos Enrique Godoy DO at 7/10/2021  7:58 AM     Author: Carlos Enrique Godoy DO Service: Resident Author Type: Resident    Filed: 7/10/2021  8:24 AM Date of Service: 7/10/2021  7:58 AM Status: Signed    : Carlos Enrique Godoy DO (Resident)       OBSTETRICS BRIEF TRIAGE ASSESSMENT NOTE  Hali Flower is a 30 y.o.  at 35w0d gestation based on dating ultrasound who has presented to maternity care for further evaluation of ROM. No bleeding, feeling mild contractions. Baby is moving normally.     She notes that overnight she experienced a sudden gush of fluids that was large in volume. She had also started to experience low back and lower abdomen cramping. She denies any fever, chills, visual changes, headache, abdominal pain.     PRENATAL CARE  Seen by Dr. Gricelda Hoang DO at [facility] [place].    OB History        1    Para        Term                AB        Living           SAB        TAB        Ectopic        Multiple        Live Births                     PAST MEDICAL HISTORY  Past Medical History:   Diagnosis Date   ? Fatigue     Created by Conversion        PAST SURGICAL HISTORY   Past Surgical History:   Procedure Laterality Date   ? WISDOM TOOTH EXTRACTION         PHYSICAL EXAMINATION   /84 (Patient Position: Semi-guillory, Cuff Size: Adult Regular)   Pulse (!) 108   Temp 98  F (36.7  C) (Oral)   Resp 18   Ht 5' 2.25\" (1.581 m)   Wt 195 lb (88.5 kg)   LMP 2020   BMI 35.38 kg/m    Gen: appears comfortable in no acute distress  HEENT: Normocephalic  CV: regular rate and rhythm without murmur  Lungs: clear to ausculation, good air movement throughout  Abdomen: Gravid, non-tender,  Extremities: no lower extremity edema    FETAL HEART MONITORING   Category 1 strip    CONTRACTIONS  Mild    LAB RESULTS  ROMplus positive.     ASSESSMENT/PLAN:   30 y.o.  at 35w0d gestation presenting to labor & delivery for premature " rupture of membranes.  1. PROM- ROM+ positive.        -Admit to OB       -GBS swab performed       -Bedside US confirmed vertex position.        -Betamethasone ordered       -PCN ordered    With diversion of care from [facility], discussed with RN Colette Pappas and Dr. Mira Kerr, Dr. Kerr will assume further care of pt.    Carlos Enrique Mancia DO  [facility] Resident PGY-2  Pager: [phone]

## 2021-07-10 NOTE — PROGRESS NOTES
Progress Notes by Colette Pappas, RN at 7/10/2021  1:55 PM     Author: Colette Pappas, RN Service: -- Author Type: Registered Nurse    Filed: 7/10/2021  2:04 PM Date of Service: 7/10/2021  1:55 PM Status: Signed    : Colette Pappas, RN (Registered Nurse)       Around 1330, Hali let us know she would like to try an ECV without an epidural.  Dr. Kerr notified at 1400 when available.  Plan to do an 18g IV from PICC team and then we will move forward with ECV.

## 2021-07-10 NOTE — PROGRESS NOTES
"Progress Notes by Colette Pappas, RN at 7/10/2021  7:46 AM     Author: Colette Pappas, RN Service: -- Author Type: Registered Nurse    Filed: 7/10/2021  8:12 AM Date of Service: 7/10/2021  7:46 AM Status: Signed    : Colette Pappas RN (Registered Nurse)       Care transferred to Dr. Kerr.  Dr. Kerr given patient SBAR:    S: Hali is a divert from [facility].  She SROM'd at 0300 this morning and was grossly ruptured on arrival.  She is a  at 35 weeks here with her  Kendell.  Raji Newman will be arriving shortly.    B: Hali reports an uncomplicated pregnancy.  At one point there was fluid seen on the baby's kidney, but recent USs showed the fluid had resolved and there was no further recommendation for follow-up.  No hypertension, no diabetes.  Hali denies any chronic medical problems but reports a history of anxiety.    A:  Hali appears anxious this morning but reports she is \"settling in.\" VSS. FHR category 1. Contractions are mild and started around 0330. Currently about every 5 min.  Betamethasone received on admission, see MAR. Physical assessment WDL.  Mild LE edema noted.  She is more comfortable moving around during contractions and has been changing positions frequently.  Hali reports US for vertex was done on admission, and GBS swab was collected and sent.     R:  Care transferred from residents to COLLEEN Quintanilla.  Orders received to switch from PPROM Abx to GBS ABX as patient appears to be going into labor.  Continue with labor support and monitoring.       "

## 2021-07-10 NOTE — H&P
"H&P by Mira Kerr DO at 7/10/2021  2:31 PM     Author: Mira Kerr DO Service: -- Author Type: Physician    Filed: 7/10/2021  2:34 PM Date of Service: 7/10/2021  2:31 PM Status: Signed    : Mira Kerr DO (Physician)       OB ADMISSION     Date: 7/10/2021  NAME: Hali Flower  : 1990  MRN: [id number]     CC: I think that my water broke     HPI: Hlai Flower is a 30 y.o. female,  with  single inter-uterine gestation at 35w0d, with Estimated Date of Delivery: 21. She presented to L&D with concern that her water had broken around 3 am today .  Pregnancy has been complicated by transverse fetal lie. Patient denies headache, visual changes, RUQ pain. She reports good fetal movement.    OB HISTORY   OB History    Para Term  AB Living   1             SAB TAB Ectopic Multiple Live Births                  # Outcome Date GA Lbr Srikanth/2nd Weight Sex Delivery Anes PTL Lv   1 Current                PAST MEDICAL HISTORY:  Past Medical History:   Diagnosis Date   ? Fatigue     Created by Conversion         PAST SURGICAL HISTORY:   Past Surgical History:   Procedure Laterality Date   ? WISDOM TOOTH EXTRACTION          SOCIAL HISTORY  Reviewed, patient denies smoking, alcohol and drug use      MEDICATIONS  No current facility-administered medications on file prior to encounter.      Current Outpatient Medications on File Prior to Encounter   Medication Sig Dispense Refill   ? levonorgestrel-ethinyl estradiol (NORDETTE) 0.15-0.03 mg per tablet Take 1 tablet by mouth daily. 3 Package 4   ? nitrofurantoin, macrocrystal-monohydrate, (MACROBID) 100 MG capsule Take 100 mg by mouth 2 (two) times a day.         ALLERGIES  No Known Allergies    ROS: otherwise negative except what is stated in HPI.     PHYSICAL EXAM   /83   Pulse (!) 110   Temp 98.7  F (37.1  C) (Oral)   Resp 18   Ht 5' 2.25\" (1.581 m)   Wt 195 lb (88.5 kg)   LMP 2020   BMI 35.38 " kg/m     Gen: no acute distress, resting comfortably   CV: acyanotic   Heart: regular rate and rhythm   Pulm: unlabored respirations, clear to ausculation bilaterally    Abd: gravid, soft, nontender   Cervix: deferred  Extremities: soft, nontender   FHR: positive, category 1  [place]: regular contractions      LABS  Prenatal Labs  Result Component Result Previous Result   ABO/Rh External Result A Positive (2021) No Results   ABORh A POS (7/10/2021) No Results   Hemoglobin External Result 14.2 (2021) No Results   Rubella External Result Immune (2021) No Results        IMPRESSION  30 y.o.  at 35w0d    rupture of membranes      PLAN  - Admit to hospital   - Proceed towards delivery   - GBS protocol started due to laboring  - bedside ultrasound performed and transverse fetal lie is noted.  Options for ECV vs. C/s are discussed with Hali and her parents.  After questions were answered, she elects for ECV.  She understands that if unsuccessful, we will proceed with  section.       Mira Kerr, DO

## 2021-07-10 NOTE — PROGRESS NOTES
Progress Notes by Zohra Landa RN at 7/10/2021  5:45 AM     Author: Zohra Landa RN Service: -- Author Type: Registered Nurse    Filed: 7/10/2021  6:30 AM Date of Service: 7/10/2021  5:45 AM Status: Signed    : Zohra Landa RN (Registered Nurse)       Patient arrived to unit complaining of possible rupture of membranes early this morning. She was diverted from [facility]. She reports continued leaking and visualization of her pad shows copious amounts of clear fluid. ROM+ collected, and report given to Linda NIX RN. Carlos Enrique Godoy MD will be down to evaluate patient shortly and give orders.

## 2021-07-10 NOTE — PROGRESS NOTES
Progress Notes by Colette Pappas, RN at 7/10/2021  4:18 PM     Author: Colette Pappas, RN Service: -- Author Type: Registered Nurse    Filed: 7/10/2021  4:18 PM Date of Service: 7/10/2021  4:18 PM Status: Signed    : Colette Pappas, RN (Registered Nurse)       HIRA Black to bedside to discuss care of at 35 weeker with family.  No new questions or concerns at this time.

## 2021-07-10 NOTE — TELEPHONE ENCOUNTER
"Triage Call  OB Triage Call    Reason for call:   Pt calling with report of fluid gush at 3:00 AM.   @ 35 wks. Denies contractions.    Assessment:  possible ROM.  Needs facility with available NICU bed    Plan: Due to Gestation of 35 weeks, [facility] unable to accept pt due to NICU at capacity.  [facility] contacted but also unable to accept 35 wk gest due to lack of NICU bed.  [facility] accepted admission, so pt advised to go to [facility] for eval for ROM and fetal wellbeing.    Patient planned to deliver at [place], but  Diverted to [facility] (due to no bed in NICU at [facility])  Patient's primary OB Provider is Dr Hoang [facility].      Is patient's primary OB from [place]? No- Patient's primary OB provider is a Physician.  Labor and delivery at [place] ([phone]) notified of patient's pending arrival.  Report given to Jennie OLEARY.  Per Charge Nurse, cannot accept pt due to gestation and no capacity in the NICU.         35w0d  Estimated Date of Delivery: Aug 14, 2021        OB History    Para Term  AB Living   1 0 0 0 0 0   SAB TAB Ectopic Multiple Live Births   0 0 0 0 0      # Outcome Date GA Lbr Srikanth/2nd Weight Sex Delivery Anes PTL Lv   1 Current               Obstetric Comments   It's a GIRL!   FOB is Kendell       No results found for: GBS       Lilliam Salazar RN 07/10/21 3:00 AM  [facility] Nurse Advisor    Reason for Disposition    Leakage of fluid from vagina    Additional Information    Negative: [1] SEVERE abdominal pain (e.g., excruciating) AND [2] constant AND [3] present > 1 hour    Negative: Severe bleeding (e.g., continuous red blood from vagina, or large blood clots)    Negative: Umbilical cord hanging out of the vagina (shiny, white, curled appearance, \"like telephone cord\")    Negative: Uncontrollable urge to push (i.e., feels like baby is coming out now)    Negative: Can see baby    " Negative: Sounds like a life-threatening emergency to the triager    Negative: < 20 weeks pregnant    Negative: Vaginal bleeding    Protocols used: PREGNANCY - RUPTURE OF [id number]

## 2021-07-10 NOTE — OP NOTE
Op Note by Mira Kerr DO at 7/10/2021  4:07 PM     Author: Mira Kerr DO Service: -- Author Type: Physician    Filed: 7/10/2021  4:10 PM Date of Service: 7/10/2021  4:07 PM Status: Signed    : Mira Kerr DO (Physician)       PROCEDURE NOTE - EXTERNAL CEPHALIC VERSION     NAME:  Hali Flower   MRN: [id number]   : 1990     DATE OF SERVICE: 7/10/2021     PREOPERATIVE DIAGNOSIS: transverse position                                                      IUP at 35 weeks    POSTOPERATIVE DIAGNOSIS: same as preop    PROCEDURE: non-successful external cephalic version    SURGEON: Mira Kerr DO    ASSISTANT: nursing staff, Colette Pappas    ANESTHESIA: none    COMPLICATIONS: none    DISPOSITION: stable    PREOPERATIVE CONSENT: The risks of the procedure were discussed with the patient including but not exclusive fetal maternal hemorrhage, need for emergent CSection or nonurgent CSection.  Informed consent was obtained.    PROCEDURE:  After obtaining informed consent, fetal heart tones were checked and were stable with a reactive NST. Ultrasound verified transverse position and location of placenta. The fetal back was identified, as well as the location of the head.  Terbutaline 0.25 mg IM was given by the RN.  Gentle pressure was applied to the fetal occiput with very little movement.  The patient tolerated this procedure with moderate discomfort.  The patient remained in the room in stable condition for futher monitoring following the procedure.    Mira Kerr DO      CC: Socorro Finnegan MD,

## 2021-07-10 NOTE — PROGRESS NOTES
Progress Notes by Mira Kerr DO at 7/10/2021  4:11 PM     Author: Mira Kerr DO Service: -- Author Type: Physician    Filed: 7/10/2021  4:12 PM Date of Service: 7/10/2021  4:11 PM Status: Signed    : Mira Kerr DO (Physician)       Progress note:    After unsuccessful ECV, we discussed  section delivery.  The risks of surgery, the process of the procedure and expectations for recovery were discussed.    Will proceed with scheduling primary low transverse  section for 18:00.    GISSEL Kerr DO

## 2021-07-10 NOTE — PROGRESS NOTES
Progress Notes by Colette Pappas, RN at 7/10/2021  2:46 PM     Author: Colette Pappas, RN Service: -- Author Type: Registered Nurse    Filed: 7/10/2021  2:46 PM Date of Service: 7/10/2021  2:46 PM Status: Signed    : Colette Pappas, RN (Registered Nurse)       Dr. Kerr at bedside at 1440 and aware that terb was just given for ECV.

## 2021-07-10 NOTE — PLAN OF CARE
Plan of Care by Colette Pappas RN at 7/10/2021  6:08 PM     Author: Colette Pappas RN Service: -- Author Type: Registered Nurse    Filed: 7/10/2021  6:09 PM Date of Service: 7/10/2021  6:08 PM Status: Signed    : Colette Pappas RN (Registered Nurse)         Problem: Knowledge Deficit  Goal: Verbalizes understanding of labor plan  Outcome: Progressing   Scheduled c/s for PPROM with malpresentation after unsuccessful ECV

## 2021-07-10 NOTE — PROGRESS NOTES
Progress Notes by Colette Pappas, RN at 7/10/2021  2:53 PM     Author: Colette Pappas RN Service: -- Author Type: Registered Nurse    Filed: 7/10/2021  2:54 PM Date of Service: 7/10/2021  2:53 PM Status: Signed    : Colette Pappas RN (Registered Nurse)       Dr. Kerr at bedside for ECV.  Consent received.

## 2021-07-11 LAB
ALLERGIC TO PENICILLIN: NO
BACTERIA SPEC CULT: NO GROWTH
GP B STREP DNA SPEC QL NAA+PROBE: NEGATIVE
HGB BLD-MCNC: 11.6 G/DL (ref 11.7–15.7)

## 2021-07-11 PROCEDURE — 250N000013 HC RX MED GY IP 250 OP 250 PS 637

## 2021-07-11 PROCEDURE — 85018 HEMOGLOBIN: CPT | Performed by: FAMILY MEDICINE

## 2021-07-11 PROCEDURE — 250N000011 HC RX IP 250 OP 636

## 2021-07-11 PROCEDURE — 120N000001 HC R&B MED SURG/OB

## 2021-07-11 PROCEDURE — 36415 COLL VENOUS BLD VENIPUNCTURE: CPT | Performed by: FAMILY MEDICINE

## 2021-07-11 PROCEDURE — 999N000249 HC STATISTIC C-SECTION ON UNIT

## 2021-07-11 RX ADMIN — ACETAMINOPHEN 975 MG: 325 TABLET ORAL at 08:47

## 2021-07-11 RX ADMIN — DOCUSATE SODIUM AND SENNOSIDES 1 TABLET: 8.6; 5 TABLET, FILM COATED ORAL at 09:30

## 2021-07-11 RX ADMIN — ACETAMINOPHEN 975 MG: 325 TABLET ORAL at 21:58

## 2021-07-11 RX ADMIN — KETOROLAC TROMETHAMINE 30 MG: 30 INJECTION, SOLUTION INTRAMUSCULAR; INTRAVENOUS at 08:49

## 2021-07-11 RX ADMIN — ACETAMINOPHEN 975 MG: 325 TABLET ORAL at 15:04

## 2021-07-11 RX ADMIN — KETOROLAC TROMETHAMINE 30 MG: 30 INJECTION, SOLUTION INTRAMUSCULAR; INTRAVENOUS at 15:04

## 2021-07-11 RX ADMIN — DOCUSATE SODIUM AND SENNOSIDES 1 TABLET: 8.6; 5 TABLET, FILM COATED ORAL at 22:02

## 2021-07-11 RX ADMIN — KETOROLAC TROMETHAMINE 30 MG: 30 INJECTION, SOLUTION INTRAMUSCULAR; INTRAVENOUS at 21:56

## 2021-07-11 ASSESSMENT — MIFFLIN-ST. JEOR: SCORE: 1561.73

## 2021-07-11 NOTE — PLAN OF CARE
Problem: Pain (Postpartum  Delivery)  Goal: Acceptable Pain Control  Outcome: Improving   Pt had tylenol and toradol for pain.

## 2021-07-11 NOTE — PLAN OF CARE
Problem: Breastfeeding  Goal: Effective Breastfeeding  Outcome: Improving   Pt has initiated hand expression and pumping and getting adequate amount.

## 2021-07-11 NOTE — PLAN OF CARE
Problem: Pain (Postpartum  Delivery)  Goal: Acceptable Pain Control  Outcome: Improving   Pain has been under control with pain medications. Continue to give as scheduled

## 2021-07-11 NOTE — OP NOTE
Op Note by Mira Kerr DO at 7/10/2021  8:12 PM     Author: Mira Kerr DO Service: -- Author Type: Physician    Filed: 7/10/2021  8:18 PM Date of Service: 7/10/2021  8:12 PM Status: Signed    : Mira Kerr DO (Physician)        Section Operative Note      Pre-operative Diagnosis: 1.  Intrauterine pregnancy 35 week 0 days gestation  2.   rupture of membranes  3.  Transverse fetal lie, unsuccessful external cephalic version    Post-operative Diagnosis: same, delivered    Procedure:  Primary low transverse  section with double layer closure    Surgeon:  Mira Kerr DO    Assistants: resident    Quantitative Blood Loss:  Delivery QBL (mL): 250     Complications:  None; patient tolerated the procedure well.    Specimens: placenta    Anesthesia: Spinal anesthesia with Duramorph; TAP Block    Findings:  Liveborn female  in the vertex presentation, normal fallopian tubes and ovaries bilaterally    Indications for surgery:  29yo  at 35wk who presented with ruptured membranes and early labor.  She was found to have transverse fetal lie and ECV was attempted and unsuccessful, therefore the decision was made to proceed with  section.  The risks and benefits were discussed and consent obtained to proceed.    Procedure Details    The patient was taken to the Operating Room, identified as Hali Flower and the procedure verified as  Delivery. A Time Out was held and the above information confirmed. Spinal anesthesia was placed. She was given 2 grams of Ancef preoperatively and 500mg azithromycin due to laboring prior to . IPCs were placed bilaterally and Seymour catheter was inserted. She was then prepped and draped in the normal sterile fashion in the supine position with a leftward tilt. Spinal anesthesia is found to be adequate.   A Pfannenstiel skin incision was then made with the scalpel and carried through to the  underlying layer of fascia with the scalpel. The fascia was incised in the midline and the incision extended laterally with Bhatt scissors. The superior aspect of the fascia was grasped with Kocher clamps, elevated, and the underlying rectus muscles dissected off bluntly and sharply with Bhatt scissors. This was similarly performed on the fascia of the inferior aspect of the incision. The rectus muscles were then  in the midline, and the peritoneum identified, and entered bluntly. The peritoneal incision was then extended superiorly and inferiorly with good visualization of the bladder.       The bladder blade was then inserted and the lower uterine segment was incised just above the vesicouterine peritoneum, and extended laterally with blunt force. The bladder blade was removed and the infant was delivered breech using the typical maneuvers.  The infant was handed off immediately to the waiting P and nursery room nurses due to lack of fetal tone.      The placenta was then removed manually, the uterus exteriorized, and cleared of all clots and debris. The uterine incision was repaired with 0 Monocryl in a running, locked fashion. A second layer of the same suture was used in an imbricating fashion to obtain excellent hemostasis. The uterus was returned to the abdomen. The gutters were cleared of all clots. The fascia was approximated with 0 Vicryl in a running fashion. The skin was closed with 4-0 Monocryl.     The patient tolerated the procedure well. Sponge, lap, instrument, and needle counts were correct times two. The patient was taken to the recovery room in stable condition.    Mira Kerr, [facility] OBN  [phone]

## 2021-07-11 NOTE — PLAN OF CARE
Plan of Care by Lily Zapata, RN at 7/10/2021 10:03 PM     Author: Lily Zapata RN Service: -- Author Type: Registered Nurse    Filed: 7/10/2021 10:04 PM Date of Service: 7/10/2021 10:03 PM Status: Signed    : Lily Zapata RN (Registered Nurse)         Problem:  Postpartum Care  Goal: Patient vital signs are stable  Outcome: Progressing     Problem:  Postpartum Care  Goal: Dressing intact until removed with any drainage marked  Outcome: Progressing     Problem:  Postpartum Care  Goal: Fundus firm at midline  Outcome: Progressing   VSS post delivery. Fundus firm, midline. Small amount of rubra lochia present on hudson pad. Seymour in place. Pt denies pain. Decreased sensation to legs bilaterally. Will continue to monitor.

## 2021-07-11 NOTE — PROGRESS NOTES
Progress Note    Doing well. Tired. Seymour was removed and she has not yet voided. Pain controlled.     Temp:  [97.9  F (36.6  C)-98.5  F (36.9  C)] 97.9  F (36.6  C)  Pulse:  [] 92  Resp:  [16-18] 18  BP: (121-136)/(72-84) 128/72  SpO2:  [98 %-99 %] 99 %    NAD, AAO x 3  Abdomen soft, appropriate post op discomfort  Dressing clean and dry  No c/c/e    A/P: 31 yo POD #1 s/p Primary LTCS 2/2 transverse lie with unsuccessful ECV  -- Routine post op care  -- Has yet to void. Bladder scan 122 ml. Will have patient hydrate and monitor closely.     Shea Louise MD  (P) [phone]

## 2021-07-12 VITALS — HEIGHT: 62 IN | BODY MASS INDEX: 35.88 KG/M2 | WEIGHT: 195 LBS

## 2021-07-12 PROCEDURE — 120N000001 HC R&B MED SURG/OB

## 2021-07-12 PROCEDURE — 250N000013 HC RX MED GY IP 250 OP 250 PS 637

## 2021-07-12 PROCEDURE — 250N000011 HC RX IP 250 OP 636

## 2021-07-12 RX ADMIN — ACETAMINOPHEN 975 MG: 325 TABLET ORAL at 22:49

## 2021-07-12 RX ADMIN — OXYCODONE HYDROCHLORIDE 5 MG: 5 TABLET ORAL at 07:02

## 2021-07-12 RX ADMIN — IBUPROFEN 800 MG: 800 TABLET, FILM COATED ORAL at 10:22

## 2021-07-12 RX ADMIN — OXYCODONE HYDROCHLORIDE 5 MG: 5 TABLET ORAL at 11:31

## 2021-07-12 RX ADMIN — ACETAMINOPHEN 975 MG: 325 TABLET ORAL at 04:00

## 2021-07-12 RX ADMIN — DOCUSATE SODIUM 50 MG AND SENNOSIDES 8.6 MG 2 TABLET: 8.6; 5 TABLET, FILM COATED ORAL at 10:22

## 2021-07-12 RX ADMIN — OXYCODONE HYDROCHLORIDE 5 MG: 5 TABLET ORAL at 19:54

## 2021-07-12 RX ADMIN — ACETAMINOPHEN 975 MG: 325 TABLET ORAL at 16:50

## 2021-07-12 RX ADMIN — IBUPROFEN 800 MG: 800 TABLET, FILM COATED ORAL at 16:50

## 2021-07-12 RX ADMIN — OXYCODONE HYDROCHLORIDE 5 MG: 5 TABLET ORAL at 15:36

## 2021-07-12 RX ADMIN — OXYCODONE HYDROCHLORIDE 5 MG: 5 TABLET ORAL at 00:43

## 2021-07-12 RX ADMIN — DOCUSATE SODIUM 50 MG AND SENNOSIDES 8.6 MG 2 TABLET: 8.6; 5 TABLET, FILM COATED ORAL at 22:26

## 2021-07-12 RX ADMIN — IBUPROFEN 800 MG: 800 TABLET, FILM COATED ORAL at 22:49

## 2021-07-12 RX ADMIN — KETOROLAC TROMETHAMINE 30 MG: 30 INJECTION, SOLUTION INTRAMUSCULAR; INTRAVENOUS at 04:01

## 2021-07-12 RX ADMIN — ACETAMINOPHEN 975 MG: 325 TABLET ORAL at 10:23

## 2021-07-12 NOTE — PROGRESS NOTES
"POSTPARTUM DAY # 2/       Subjective:  The patient feels well. The patient has no emotional concerns. Pain is well controlled with current medications. Patient has transitioned to oral pain medications. The patient is ambulating well. She is voiding after catheter removal and is starting to pass gas.The amount and color of the lochia is appropriate for the duration of recovery, patient denies clots. The baby is well, in SCN.    Objective   The patient has a blood pressure which is within the normal range. Urinary output is adequate.     Exam:   /83   Pulse 93   Temp 98  F (36.7  C) (Oral)   Resp 20   Ht 1.581 m (5' 2.25\")   Wt 88.5 kg (195 lb)   LMP 2020 (Approximate)   SpO2 98%   Breastfeeding Unknown   BMI 35.38 kg/m    General: NAD  Abdomen: soft, NT  Fundus: firm, nontender  Incision: C/D/I  Ext: no pain       Impression:   Normal postpartum course. POD#2, LTCS secondary to unstable lie       Plan:        POSTOPERATIVE   - Continue current care.  - Doing well  - Likely home tomorrow if stable       Angela Allison PA-C     "

## 2021-07-12 NOTE — LACTATION NOTE
This note was copied from a baby's chart.  This writer met with Hali in her room to discuss making milk for a premature infant.  This writer recommended a hospital grade breast pump, as she will be pump dependant for a few weeks.  She is calling her insurance company to check on coverage for the rental cost.  We reviewed the use of the hospital grade pump, using the initiate and maintain programs and to pump 8+ times in 24 hours.  Hali to contact this writer when she knows coverage on the breast pump.

## 2021-07-13 LAB
LAB AP CHARGES (HE HISTORICAL CONVERSION): NORMAL
PATH REPORT.COMMENTS IMP SPEC: NORMAL
PATH REPORT.FINAL DX SPEC: NORMAL
PATH REPORT.GROSS SPEC: NORMAL
PATH REPORT.MICROSCOPIC SPEC OTHER STN: NORMAL
PATH REPORT.RELEVANT HX SPEC: NORMAL
RESULT FLAG (HE HISTORICAL CONVERSION): NORMAL

## 2021-07-13 PROCEDURE — 250N000013 HC RX MED GY IP 250 OP 250 PS 637

## 2021-07-13 PROCEDURE — 120N000001 HC R&B MED SURG/OB

## 2021-07-13 RX ADMIN — ACETAMINOPHEN 975 MG: 325 TABLET ORAL at 17:20

## 2021-07-13 RX ADMIN — IBUPROFEN 800 MG: 800 TABLET, FILM COATED ORAL at 17:21

## 2021-07-13 RX ADMIN — OXYCODONE HYDROCHLORIDE 5 MG: 5 TABLET ORAL at 00:42

## 2021-07-13 RX ADMIN — DOCUSATE SODIUM AND SENNOSIDES 1 TABLET: 8.6; 5 TABLET, FILM COATED ORAL at 22:01

## 2021-07-13 RX ADMIN — IBUPROFEN 800 MG: 800 TABLET, FILM COATED ORAL at 10:56

## 2021-07-13 RX ADMIN — ACETAMINOPHEN 975 MG: 325 TABLET ORAL at 05:13

## 2021-07-13 RX ADMIN — IBUPROFEN 800 MG: 800 TABLET, FILM COATED ORAL at 05:13

## 2021-07-13 RX ADMIN — ACETAMINOPHEN 975 MG: 325 TABLET ORAL at 10:56

## 2021-07-13 RX ADMIN — DOCUSATE SODIUM 50 MG AND SENNOSIDES 8.6 MG 2 TABLET: 8.6; 5 TABLET, FILM COATED ORAL at 11:01

## 2021-07-13 NOTE — PROGRESS NOTES
[facility]    Post-Partum Progress Note    Assessment & Plan   Assessment:  Post-partum day #3  Primary low segment csection due to transverse lie      Doing well.    Plan:  anitipcate dishcarge in am  Continue with routine cares     Ivelisse Hayden     Interval History   Doing well.  Pain is well-controlled.  No fevers.  No history of foul-smelling vaginal discharge.  Good appetite.  Denies chest pain, shortness of breath, nausea or vomiting.  Vaginal bleeding is similar to a heavy menstrual flow.  Ambulatory.  Breastfeeding well.    Medications     dextrose 5% lactated ringers       naloxone (NARCAN) infusion ADULT - non-opioid reversal       oxytocin in 0.9% NaCl         acetaminophen  975 mg Oral Q6H     ibuprofen  800 mg Oral Q6H     senna-docusate  1 tablet Oral BID    Or     senna-docusate  2 tablet Oral BID       Physical Exam   Temp: 98  F (36.7  C) Temp src: Oral BP: 130/80 Pulse: 90   Resp: 18 SpO2: 98 %      Vitals:    07/10/21 0400 07/10/21 1105 07/11/21 0037   Weight: 88.5 kg (195 lb) 88.5 kg (195 lb) 88.5 kg (195 lb)     Vital Signs with Ranges  Temp:  [98  F (36.7  C)] 98  F (36.7  C)  Pulse:  [90-95] 90  Resp:  [18] 18  BP: (130-133)/(80-87) 130/80  SpO2:  [97 %-98 %] 98 %  No intake/output data recorded.    Uterine fundus is firm, non-tender and at the level of the umbilicus    Data   Recent Labs   Lab Test 06/01/21  1641   ABO A   RH Pos   AS Neg     Recent Labs   Lab Test 07/11/21  0805 05/03/21  1606   HGB 11.6* 12.6     Recent Labs   Lab Test 01/04/21  1629   RUQIGG 102       Ivelisse Hayden MD  [facility]  [phone]

## 2021-07-13 NOTE — PLAN OF CARE
Problem: Adult Inpatient Plan of Care  Goal: Optimal Comfort and Wellbeing  Outcome: Improving   Patient using tylenol and ibuprofen on schedule for pain rated at 2/10. She does not require any oxycodone today.  Pt reports she did have her first bowel movement today.

## 2021-07-13 NOTE — PLAN OF CARE
Problem: Adjustment to Role Transition (Postpartum  Delivery)  Goal: Successful Maternal Role Transition  Outcome: Improving     Very attentive to  in the NICU, Pumping every 3-4 hours and breast milk being brought to NICU to feed .       Problem: Pain (Postpartum  Delivery)  Goal: Acceptable Pain Control  Outcome: Improving  Intervention: Prevent or Manage Pain  Recent Flowsheet Documentation  Taken 2021 0042 by Trista Najera, RN  Pain Management Interventions: medication (see MAR)     Using Oxycodone when she can have it, even with minimal pain, stating that pain was out of control yesterday morning and she does not want to experience that again. Ambulating to NICU without assist.

## 2021-07-13 NOTE — PLAN OF CARE
Problem: Adjustment to Role Transition (Postpartum  Delivery)  Goal: Successful Maternal Role Transition  Outcome: Improving  Intervention: Support Maternal Role Transition  Recent Flowsheet Documentation  Taken 2021 1650 by Trista Lozano RN  Supportive Measures: positive reinforcement provided  Parent/Child Attachment Promotion: participation in care promoted  Taken 2021 1000 by Trista Lozano RN  Supportive Measures: positive reinforcement provided  Hali's been pumping every 3 hours throughout the day and evening and taking her pumped milk to baby in NICU.  She reports holding baby in the NICU.  She has usually ambulated independently; occasionally asked her  to take to baby.        Problem: Pain (Postpartum  Delivery)  Goal: Acceptable Pain Control  Outcome: Improving  Intervention: Prevent or Manage Pain  Recent Flowsheet Documentation  Taken 2021 1954 by Trista Lozano RN  Pain Management Interventions: medication (see MAR)  Taken 2021 1650 by Trista Lozano RN  Pain Management Interventions: medication (see MAR)  Taken 2021 1131 by Trista Lozano RN  Pain Management Interventions: medication (see MAR)  Taken 2021 1025 by Trista Lozano RN  Pain Management Interventions: medication (see MAR)  Hali rates her pain 3-5 and is taking Ibuprofen, Tylenol and Oxy. She reports the most pain relief with Oxy.

## 2021-07-13 NOTE — LACTATION NOTE
This note was copied from a baby's chart.  This writer met with Hali in the NICU.  She has edema in her ankles and feet.  We reviewed reverse pressure softening and hand expression.  She was encouraged to use these techniques, prn, prior to pumping or breastfeeding if the nipple/ areolar tissue is firm.  She is pumping 8+ times in 24 hours and obtaining 20+ for the past two session.  We reviewed the Maintain program.  She desires to rent a hospital grade breast pump (HGP).  Her insurance will only cover a standard breast pump OR a HGP.  She is choosing to have insurance buy the standard breast pump.  She is wanting a Spectra and instructions given on how to obtain the pump.  She will pay for the rental cost out of pocket.  Lactation to rent her the HGP tomorrow, prior to discharge.  Hali states she is nervous to put infant to the breast.  When questioned further, she states she wants to make sure infant will be okay and states she does not feed infant is ready yet.  She was instructed to talk with NICU RN, as they can help guide her as to when infant is ready to try the breast.  RN or lactation can assist with feedings.  Hali verbalizes understanding of all education given.  She denies any further questions.

## 2021-07-14 ENCOUNTER — TELEPHONE (OUTPATIENT)
Dept: OBGYN | Facility: CLINIC | Age: 31
End: 2021-07-14

## 2021-07-14 VITALS
OXYGEN SATURATION: 98 % | WEIGHT: 195 LBS | RESPIRATION RATE: 16 BRPM | DIASTOLIC BLOOD PRESSURE: 92 MMHG | SYSTOLIC BLOOD PRESSURE: 140 MMHG | BODY MASS INDEX: 35.88 KG/M2 | HEIGHT: 62 IN | TEMPERATURE: 98 F | HEART RATE: 100 BPM

## 2021-07-14 PROBLEM — O32.2XX0 TRANSVERSE LIE OF FETUS: Status: ACTIVE | Noted: 2021-07-14

## 2021-07-14 LAB
ALT SERPL W P-5'-P-CCNC: 25 U/L (ref 0–45)
AST SERPL W P-5'-P-CCNC: 24 U/L (ref 0–40)
CREAT SERPL-MCNC: 0.64 MG/DL (ref 0.6–1.1)
GFR SERPL CREATININE-BSD FRML MDRD: >90 ML/MIN/1.73M2
HGB BLD-MCNC: 12.1 G/DL (ref 11.7–15.7)
PLATELET # BLD AUTO: 284 10E3/UL (ref 150–450)

## 2021-07-14 PROCEDURE — 36415 COLL VENOUS BLD VENIPUNCTURE: CPT | Performed by: OBSTETRICS & GYNECOLOGY

## 2021-07-14 PROCEDURE — 84460 ALANINE AMINO (ALT) (SGPT): CPT | Performed by: OBSTETRICS & GYNECOLOGY

## 2021-07-14 PROCEDURE — 85049 AUTOMATED PLATELET COUNT: CPT | Performed by: OBSTETRICS & GYNECOLOGY

## 2021-07-14 PROCEDURE — 82565 ASSAY OF CREATININE: CPT | Performed by: OBSTETRICS & GYNECOLOGY

## 2021-07-14 PROCEDURE — 85018 HEMOGLOBIN: CPT | Performed by: OBSTETRICS & GYNECOLOGY

## 2021-07-14 PROCEDURE — 84450 TRANSFERASE (AST) (SGOT): CPT | Performed by: OBSTETRICS & GYNECOLOGY

## 2021-07-14 PROCEDURE — 250N000013 HC RX MED GY IP 250 OP 250 PS 637

## 2021-07-14 RX ORDER — OXYCODONE HYDROCHLORIDE 5 MG/1
5 TABLET ORAL EVERY 4 HOURS PRN
Qty: 12 TABLET | Refills: 0 | Status: SHIPPED | OUTPATIENT
Start: 2021-07-14 | End: 2023-04-03

## 2021-07-14 RX ORDER — IBUPROFEN 800 MG/1
800 TABLET, FILM COATED ORAL EVERY 6 HOURS
Qty: 40 TABLET | Refills: 0 | Status: SHIPPED | OUTPATIENT
Start: 2021-07-14 | End: 2023-04-03

## 2021-07-14 RX ADMIN — IBUPROFEN 800 MG: 800 TABLET, FILM COATED ORAL at 00:03

## 2021-07-14 RX ADMIN — ACETAMINOPHEN 975 MG: 325 TABLET ORAL at 12:46

## 2021-07-14 RX ADMIN — IBUPROFEN 800 MG: 800 TABLET, FILM COATED ORAL at 12:46

## 2021-07-14 RX ADMIN — IBUPROFEN 800 MG: 800 TABLET, FILM COATED ORAL at 06:37

## 2021-07-14 RX ADMIN — ACETAMINOPHEN 975 MG: 325 TABLET ORAL at 06:38

## 2021-07-14 RX ADMIN — DOCUSATE SODIUM AND SENNOSIDES 1 TABLET: 8.6; 5 TABLET, FILM COATED ORAL at 08:58

## 2021-07-14 RX ADMIN — ACETAMINOPHEN 975 MG: 325 TABLET ORAL at 00:03

## 2021-07-14 NOTE — PROGRESS NOTES
Outreach Note for EPIC    Hali Flower  [id number]  1990    This writer met with Hali and her  to discuss future discharge on Wednesday. The couple both thought a home nurse visit would be desirable. This writer contacted the Home Care Intake to have them check [employer] insurance to see if it will cover a home visit. Note: the address and phone number listed in the EMR for Hali is correct. Her , Kendell Flower, cell # is [phone]. Newborns name is Azra Flower and she remains in the NICU.     Patient states she has good support at home and has baby care essentials. Hali would like to stay with her  as long as possible. Outreach RN will continue to follow and assist if needed with discharge plan. No additional needs identified at this time.    Completed by: David Tapia RN

## 2021-07-14 NOTE — PROGRESS NOTES
Phone report to  regarding elevated blood pressures this morning, 3+/3+ reflexes, no clonus, swelling, no other symptoms of pre-eclampsia. Orders received.

## 2021-07-14 NOTE — PLAN OF CARE
Hali's VSS.  She is voiding and ambulating ably and often.  She's taking scheduled Tylenol and Ibuprofen for pain, but says she no longer needs Oxy.  She's pumping every 3 hours and taking increasing amounts of milk to baby in NICU.  She reports that baby is doing well and she is calm and accepting about her still being in NICU.  Her  is in the room with her and is supportive and usually accompanies her to see baby.

## 2021-07-14 NOTE — TELEPHONE ENCOUNTER
Delivered at [facility] 7/10/21 via c/section with transverse lie, at 35w 0d.    Pt is still inpatient at [facility] but expecting to be discharged today.  States baby is doing well in NICU. Pt is asking when she should be seen for postpartum visit.  RN encouraged pt to obtain discharge instructions from hospital provider today for recommended follow up appt time frame. Once orders received, RN recommended pt to call clinic to schedule postpartum visit.      Pt verbalized understanding and agreement with plan to scheduled postpartum visit upon discharge instructions and recommendations by caring provider.    Berta Tan, SUKIN RN

## 2021-07-14 NOTE — PLAN OF CARE
Problem: Breastfeeding  Goal: Effective Breastfeeding  Intervention: Promote Breast Care and Comfort  Recent Flowsheet Documentation  Taken 2021 0003 by Ilsa Ruiz, RN  Breast Care: Breastfeeding:   milk massaged towards nipple   supportive bra utilized  Breast Care: double electric breast pump utilized     Problem: Breastfeeding  Goal: Effective Breastfeeding  Intervention: Promote Effective Breastfeeding  Recent Flowsheet Documentation  Taken 2021 0003 by Ilsa Ruiz, RN  Breastfeeding Assistance: electric breast pump used  Parent/Child Attachment Promotion:   caring behavior modeled   interaction encouraged     Problem: Pain (Postpartum  Delivery)  Goal: Acceptable Pain Control  Outcome: Improving    Pt vs are WNL. Is Pumping and bringing pumped milk to NICU for baby. Pain controlled with scheduled medications. Planning on discharging in the morning.

## 2021-07-15 ENCOUNTER — LACTATION ENCOUNTER (OUTPATIENT)
Age: 31
End: 2021-07-15

## 2021-07-15 NOTE — LACTATION NOTE
This note was copied from a baby's chart.  This writer met briefly with Hali to offer lactation services.  She denies any questions and appeared proud when she displayed all the milk she just expressed for infant.  She was encouraged to continue pumping 8 times in 24 hours, until breasts are drained.  Lactation available prn.

## 2021-07-19 NOTE — DISCHARGE SUMMARY
[facility] Discharge Summary    Hali Flower MRN# [id number]   Age: 30 year old YOB: 1990     Date of Admission:  7/10/2021  Date of Discharge::  2021  2:53 PM  Admitting Physician:  Shea Louise MD  Discharge Physician:  Ivelisse Hayden MD     Home clinic: [facility]            Admission Diagnoses:    delivery delivered [O82]          Discharge Diagnosis:   Repeat  section          Procedures:   Procedure(s): Repeat low transverse  section                  Medications Prior to Admission:   The patient was not taking any medications prior to admission          Discharge Medications:     Discharge Medication List as of 2021  2:31 PM      START taking these medications    Details   ibuprofen (ADVIL/MOTRIN) 800 MG tablet Take 1 tablet (800 mg) by mouth every 6 hours, Disp-40 tablet, R-0, E-Prescribe      oxyCODONE (ROXICODONE) 5 MG tablet Take 1 tablet (5 mg) by mouth every 4 hours as needed, Disp-12 tablet, R-0, E-Prescribe         CONTINUE these medications which have NOT CHANGED    Details   Calcium-Magnesium-Zinc 333-133-5 MG TABS per tablet Take 1 tablet by mouth At Bedtime, Historical      Prenatal Vit-Fe Fumarate-FA (PRENATAL 19) 29-1 MG CHEW Take 1 tablet by mouth daily, Historical                   Consultations:   No consultations were requested during this admission          Brief History of Labor or Admission:   admitted for repeat csection            Hospital Course:   The patient's hospital course was unremarkable.  She recovered as anticipated and experienced no post-operative complications.  On discharge, her pain was well controlled. Vaginal bleeding is similar to peak menstrual flow.  Voiding without difficulty.  Ambulating well and tolerating a normal diet.  No fever or significant wound drainage.  Breastfeeding well.  Infant is stable.  No bowel movement yet.  She was discharged on post-partum day  #4.    Post-partum hemoglobin:   Hemoglobin   Date Value Ref Range Status   07/14/2021 12.1 11.7 - 15.7 g/dL Final   05/03/2021 12.6 11.7 - 15.7 g/dL Final             Discharge Instructions and Follow-Up:   Discharge diet: Regular   Discharge activity: Activity as tolerated   Discharge follow-up: Follow up with [facility]  in 2 weeks   Wound care: Ice to area for comfort  Keep wound clean and dry           Discharge Disposition:   Discharged to home      Attestation:  I have reviewed today's vital signs, notes, medications, labs and imaging.    Ivelisse Hayden MD

## 2021-07-21 ENCOUNTER — LACTATION ENCOUNTER (OUTPATIENT)
Age: 31
End: 2021-07-21

## 2021-07-21 NOTE — LACTATION NOTE
This note was copied from a baby's chart.  Lactation to room per patient and RN request to assess breastfeeding.  Mom has been pumping 4-8 ounces every 2.5-3 hours, and now baby is ready to attempt breastfeeding.    Reviewed benefit of skin to skin prior to feeding to help get baby ready for feeding, importance of feeding baby on early hunger cues, and breastfeeding 8-12 times in 24 hours for optimal infant nutrition and hydration.  Education given regarding importance of optimal positioning for deep, comfortable latch and effective milk transfer.     Assisted getting baby skin to skin, then encouraged mom to follow baby's hunger cues to position baby in cross cradle hold, and how to bring baby onto the breast chin first to elicit wide open mouth. Latch achieved quickly, and infant swallowing every suck for 15 minutes until satisfied and falling asleep at breast. Taught mom how to use massage and breast compression during feeding.  Both parents elated at this feeding, and dad taking photos of mom in cross cradle hold so he can help with positioning next feeding.    Instructed mom to pump 2nd side to comfort if infant fills after feeding first side as she has large milk supply, and mom states understanding.    Answered questions and gave encouragement.

## 2021-07-26 ENCOUNTER — LACTATION ENCOUNTER (OUTPATIENT)
Age: 31
End: 2021-07-26

## 2021-07-26 NOTE — LACTATION NOTE
This note was copied from a baby's chart.  Lactation to room per patient request as mom is experiencing clogged milk duct on the underside of her left breast. Reviewed helps for clogged ducts:  1- use of warm compresses to the affect area,  2 - gentle breast massage before and during pumping or breastfeeding, 3- breast dangling in warm water which may help reposition breast tissue and help with clogged duct release. Reviewed signs and symptoms of mastitis and instructed to call ob/gyn if fever >100, redness of affected area, increased pain, reduced supply. Mother states understanding.

## 2021-07-29 ENCOUNTER — HOSPITAL ENCOUNTER (EMERGENCY)
Facility: HOSPITAL | Age: 31
Discharge: HOME | End: 2021-07-29
Attending: EMERGENCY MEDICINE | Admitting: EMERGENCY MEDICINE
Payer: COMMERCIAL

## 2021-07-29 ENCOUNTER — LACTATION ENCOUNTER (OUTPATIENT)
Age: 31
End: 2021-07-29

## 2021-07-29 VITALS
RESPIRATION RATE: 16 BRPM | SYSTOLIC BLOOD PRESSURE: 121 MMHG | OXYGEN SATURATION: 99 % | HEART RATE: 90 BPM | TEMPERATURE: 98.3 F | HEIGHT: 62 IN | WEIGHT: 180 LBS | BODY MASS INDEX: 33.13 KG/M2 | DIASTOLIC BLOOD PRESSURE: 88 MMHG

## 2021-07-29 DIAGNOSIS — S60.459A FOREIGN BODY OF FINGER: ICD-10-CM

## 2021-07-29 PROCEDURE — 99282 EMERGENCY DEPT VISIT SF MDM: CPT

## 2021-07-29 ASSESSMENT — MIFFLIN-ST. JEOR: SCORE: 1489.72

## 2021-07-29 NOTE — LACTATION NOTE
"This note was copied from a baby's chart.  This writer met with Hali per her request, to observe infant at the breast.  Infant in football hold, slightly on her back.  Infant would open her mouth and suck, then fall off the breast.  Several attempts were made.  This writer left the room to obtain a 20 mm nipple shield to assist with latch.  Upon return, Hali states that infant has been latched since this writer left the room, however, she was unsure if infant was transferring.  With gentle stimulation, infant began to suck.  She would suck 4-5 times, then \"gulp\" then take several breaths.  Infant unable to suck rhythmically with a suck, swallow, breathe of 1:1:1.  This writer observed infant sucking/ swallowing three separate times.  Very little milk transfer this session.  We discussed possible positioning infant completely on her side, as she does when bottle feeding.  This would give infant the ability to let the milk pool in her check and swallow when she is ready.  This writer did educate Hali on the nipple shield, stating is use and giving her the care plan below.  Hali verbalizes understanding of all education given.  She denies any further questions.  She will call for lactation to observe, prn.  RN updated.    Care Plan for Nipple Shield Use    When to initiate:  Nipple shields can help babies learn to breastfeed in the following situations    Inverted/flat nipples.    Premature babies/ Babies with a weak suck.    Sore nipples, after correction the latch.    Transitioning baby off the bottle to the breast.    Babies who thrust or retract their tongue while nursing.  Nipple Shield Tips:    To help the nipple shield cling to the breast, wet the nipple shield with warm water before applying.     Invert the nipple shield   way inside out and place over nipple.      Stretch the nipple shield and ease the shield right side out and onto nipple.    The nipple shield must be a good fit for you and baby.  "     Baby needs to have a deep latch onto the nipple shield with the lips flanged onto the breast.  If not, compression of the nipple may occur, reducing the flow of milk and affecting milk transfer.   If any part of the nipple is seen while the baby nurses, bring baby's chin toward the breast for a deeper latch.    When infant is on the breast with the nipple shield, effective milk transfer should occur.    o Signs of effective milk transfer: Hearing swallows, comfortable latch, a contented baby after feedings, meeting output goals, softening of breasts.   o If baby is not transferring milk effectively, pump breasts for 15 minutes.  Once mature milk comes in, pump breasts until soft and drained.   An empty breast makes milk.   Feed expressed milk to baby as baby cues.    Wash the nipple shield in warm soapy water and rinse after use.  Weaning from a Nipple Shield:    Some babies need the nipple shield for a few days or a few weeks.  Once feeding improves, remove the nipple shield after a few minutes of breastfeeding and try to latch baby without the nipple shield.      Early and frequent follow up at the [facility] is recommended.  [phone].

## 2021-07-29 NOTE — LACTATION NOTE
This note was copied from a baby's chart.  This writer met with Hali in the NICU to offer lactation support.  Hali states infant continues of oxygen and has not breast fed for the last two days.  This writer instructed Hali to call for this writer when she breastfeeds infant so an assessment can be made.  Hali states she is pumping 8+ times a day and has a good milk supply.  She denies any further questions at this time.

## 2021-07-29 NOTE — ED TRIAGE NOTES
Pt reports that she just had a baby and gained some water weight. Her wedding ring is stuck on her finger and she is unable to get it off. Pt reports some pain at the site.

## 2021-07-29 NOTE — ED PROVIDER NOTES
NAME: Hali Flower  AGE: 30 year old female  YOB: 1990  MRN: [id number]  EVALUATION DATE & TIME: No admission date for patient encounter.    PCP: Jc Murcia    ED PROVIDER: Hayder Nielsen M.D.      Chief Complaint   Patient presents with     Ring stuck         FINAL IMPRESSION:  1. Foreign body of finger        MEDICAL DECISION MAKIN:39 AM I met with the patient, obtained history, performed an initial exam, and discussed options and plan for diagnostics and treatment here in the ED.   3:14 AM I used a ring cutter to get the ring off of the patient. I discussed the plan for discharge with the patient, and patient is agreeable. We discussed supportive cares at home and reasons for return to the ER including new or worsening symptoms - all questions and concerns addressed. Patient to be discharged by RN.  Patient was clinically assessed and consented to treatment. After assessment, medical decision making and workup were discussed with the patient. The patient was agreeable to plan for testing, workup, and treatment.  Pertinent Labs & Imaging studies reviewed. (See chart for details)         Hali Flower is a 30 year oldfemale who presents with wedding ring stuck on finger.   Differential diagnosis includes but not limited to cellulitis, abscess, ischemia, foreign body stuck.  Patient recently delivered and has some swelling in her hands which contributed to her wedding ring being stuck.  Ring was cut using a ring cutter and then pried off with forceps.  Patient tolerated procedure and after removal will be discharged.    The importance of close follow up was discussed. We reviewed warning signs and symptoms, and I instructed Ms. Flower to return to the emergency department immediately if she develops any new or worsening symptoms. I provided additional verbal discharge instructions. Ms. Flower expressed understanding and agreement with this plan of care, her  questions were answered, and she was discharged in stable condition.       0 minutes of critical care time    PPE: Provider wore gloves, N95 mask, eye protection, surgical cap, and paper mask.   MEDICATIONS GIVEN IN THE EMERGENCY:  Medications - No data to display    NEW PRESCRIPTIONS STARTED AT TODAY'S ER VISIT:  Discharge Medication List as of 2021  3:17 AM             =================================================================    HPI    Patient information was obtained from: The patient    Use of : N/A        Hali Flower is a 30 year old female with a past medical history of  on 7/10/2021, who presents to the ED via walk-in for evaluation of a stuck ring.    The patient reports 19 days ago she delivered a baby and has since had increased fluid in her hands. She notes that her hands are swollen and tonight she has been unable to get her ring off. She tried using soap at home unsuccessfully and then subsequently was prompted to present to the ED. The patient denies any other symptoms or complaints at this time.      REVIEW OF SYSTEMS   Review of Systems   Constitutional:        Positive for stuck ring on finger.   Musculoskeletal:        Positive for hand swelling.    All other systems reviewed and are negative.       PAST MEDICAL HISTORY:  Past Medical History:   Diagnosis Date     Other malaise and fatigue     Created by Conversion      Transverse lie of fetus 2021       PAST SURGICAL HISTORY:  Past Surgical History:   Procedure Laterality Date      SECTION N/A 7/10/2021    Procedure:  SECTION;  Surgeon: Mira Kerr DO;  Location: [facility] L+[facility] OR;  Service: Obstetrics     WISDOM TOOTH EXTRACTION         CURRENT MEDICATIONS:    No current facility-administered medications for this encounter.    Current Outpatient Medications:      Calcium-Magnesium-Zinc 333-133-5 MG TABS per tablet, Take 1 tablet by mouth At Bedtime, Disp: , Rfl:      ibuprofen  (ADVIL/MOTRIN) 800 MG tablet, Take 1 tablet (800 mg) by mouth every 6 hours, Disp: 40 tablet, Rfl: 0     oxyCODONE (ROXICODONE) 5 MG tablet, Take 1 tablet (5 mg) by mouth every 4 hours as needed, Disp: 12 tablet, Rfl: 0     Prenatal Vit-Fe Fumarate-FA (PRENATAL 19) 29-1 MG CHEW, Take 1 tablet by mouth daily, Disp: , Rfl:     ALLERGIES:  No Known Allergies    FAMILY HISTORY:  Family History   Problem Relation Age of Onset     Cancer Mother         skin     Cancer Maternal Grandmother         breast     Cancer Maternal Grandfather         lung     Diabetes Paternal Grandfather      Breast Cancer Maternal Grandmother      Thyroid Disease Mother      Skin Cancer Mother      Ovarian Cancer Other      Lung Cancer Maternal Grandfather        SOCIAL HISTORY:   Social History     Socioeconomic History     Marital status: Single     Spouse name: Not on file     Number of children: Not on file     Years of education: Not on file     Highest education level: Not on file   Occupational History     Not on file   Tobacco Use     Smoking status: Passive Smoke Exposure - Never Smoker     Smokeless tobacco: Never Used     Tobacco comment: fiance smokes.   Substance and Sexual Activity     Alcohol use: Yes     Alcohol/week: 2.0 - 6.0 standard drinks     Comment: occ     Drug use: No     Sexual activity: Yes     Partners: Male     Birth control/protection: None, Pill   Other Topics Concern     Not on file   Social History Narrative     Not on file     Social Determinants of Health     Financial Resource Strain:      Difficulty of Paying Living Expenses:    Food Insecurity:      Worried About Running Out of Food in the Last Year:      Ran Out of Food in the Last Year:    Transportation Needs:      Lack of Transportation (Medical):      Lack of Transportation (Non-Medical):    Physical Activity:      Days of Exercise per Week:      Minutes of Exercise per Session:    Stress:      Feeling of Stress :    Social Connections:      Frequency  "of Communication with Friends and Family:      Frequency of Social Gatherings with Friends and Family:      Attends [employer] Services:      Active Member of Clubs or Organizations:      Attends Club or Organization Meetings:      Marital Status:    Intimate Partner Violence:      Fear of Current or Ex-Partner:      Emotionally Abused:      Physically Abused:      Sexually Abused:        PHYSICAL EXAM:    Vitals: /88   Pulse 90   Temp 98.3  F (36.8  C) (Oral)   Resp 16   Ht 1.575 m (5' 2\")   Wt 81.6 kg (180 lb)   LMP 11/07/2020 (Approximate)   SpO2 99%   Breastfeeding No   BMI 32.92 kg/m     Physical Exam  Vitals and nursing note reviewed.   Constitutional:       General: She is not in acute distress.     Appearance: Normal appearance. She is normal weight. She is not ill-appearing or toxic-appearing.   Cardiovascular:      Pulses: Normal pulses.   Musculoskeletal:         General: No deformity or signs of injury.      Left hand: Swelling present. No deformity, tenderness or bony tenderness. Normal strength. Normal sensation. There is no disruption of two-point discrimination. Normal capillary refill.        Arms:    Skin:     General: Skin is warm and dry.      Capillary Refill: Capillary refill takes less than 2 seconds.      Coloration: Skin is not pale.      Findings: No erythema.   Neurological:      Mental Status: She is alert.      Sensory: No sensory deficit.   Psychiatric:         Behavior: Behavior normal.           LAB:  All pertinent labs reviewed and interpreted.  Labs Ordered and Resulted from Time of ED Arrival Up to the Time of Departure from the ED - No data to display    RADIOLOGY:  No orders to display       PROCEDURES:   Procedures       I, Tanner De Leon, am serving as a scribe to document services personally performed by Dr. Hayder Nielsen  based on my observation and the provider's statements to me. I, Hayder Nielsen MD attest that Tanner De Leon is acting in a scribe " capacity, has observed my performance of the services and has documented them in accordance with my direction.      Hayder Nielsen M.D.  Emergency Medicine  [facility] EMERGENCY DEPARTMENT  [facility] [facility] [address] [phone]  Dept: [phone]       Hayder Nielsen MD  07/29/21 0764

## 2021-08-09 ENCOUNTER — LACTATION ENCOUNTER (OUTPATIENT)
Age: 31
End: 2021-08-09

## 2021-08-09 NOTE — LACTATION NOTE
This note was copied from a baby's chart.  This writer met with Hali in the NICU.  She states when she breastfeeds infant, infant becomes overwhelmed at times with her fast flow.  We discussed she can pump her breasts prior to latching infant and to pump past the first let down, then bring infant to the breast.  She will have to experiment if she needs to pump more milk off the breasts prior to latching infant, especially in the morning when her milk supply is usually at the highest level.  She will then decrease the need to pump prior to latching infant as infant can tolerate the fast flow.  Hali denies any other questions at this time.

## 2021-09-21 ENCOUNTER — MEDICAL CORRESPONDENCE (OUTPATIENT)
Dept: HEALTH INFORMATION MANAGEMENT | Facility: CLINIC | Age: 31
End: 2021-09-21

## 2021-09-25 ENCOUNTER — HEALTH MAINTENANCE LETTER (OUTPATIENT)
Age: 31
End: 2021-09-25

## 2021-11-16 ENCOUNTER — MEDICAL CORRESPONDENCE (OUTPATIENT)
Dept: HEALTH INFORMATION MANAGEMENT | Facility: CLINIC | Age: 31
End: 2021-11-16
Payer: COMMERCIAL

## 2022-03-12 ENCOUNTER — HEALTH MAINTENANCE LETTER (OUTPATIENT)
Age: 32
End: 2022-03-12

## 2022-12-26 ENCOUNTER — HEALTH MAINTENANCE LETTER (OUTPATIENT)
Age: 32
End: 2022-12-26

## 2023-04-03 ENCOUNTER — OFFICE VISIT (OUTPATIENT)
Dept: FAMILY MEDICINE | Facility: CLINIC | Age: 33
End: 2023-04-03
Payer: COMMERCIAL

## 2023-04-03 VITALS
HEIGHT: 62 IN | DIASTOLIC BLOOD PRESSURE: 74 MMHG | OXYGEN SATURATION: 98 % | SYSTOLIC BLOOD PRESSURE: 110 MMHG | HEART RATE: 85 BPM | WEIGHT: 192 LBS | BODY MASS INDEX: 35.33 KG/M2

## 2023-04-03 DIAGNOSIS — E66.812 CLASS 2 OBESITY DUE TO EXCESS CALORIES WITHOUT SERIOUS COMORBIDITY WITH BODY MASS INDEX (BMI) OF 35.0 TO 35.9 IN ADULT: ICD-10-CM

## 2023-04-03 DIAGNOSIS — Z00.00 ROUTINE GENERAL MEDICAL EXAMINATION AT A HEALTH CARE FACILITY: Primary | ICD-10-CM

## 2023-04-03 DIAGNOSIS — E04.9 ENLARGED THYROID: ICD-10-CM

## 2023-04-03 DIAGNOSIS — Z13.220 SCREENING FOR LIPID DISORDERS: ICD-10-CM

## 2023-04-03 DIAGNOSIS — E66.09 CLASS 2 OBESITY DUE TO EXCESS CALORIES WITHOUT SERIOUS COMORBIDITY WITH BODY MASS INDEX (BMI) OF 35.0 TO 35.9 IN ADULT: ICD-10-CM

## 2023-04-03 PROBLEM — Z23 NEED FOR TDAP VACCINATION: Status: RESOLVED | Noted: 2021-03-02 | Resolved: 2023-04-03

## 2023-04-03 PROBLEM — O32.2XX0 TRANSVERSE LIE OF FETUS: Status: RESOLVED | Noted: 2021-07-14 | Resolved: 2023-04-03

## 2023-04-03 PROBLEM — Z34.00 SUPERVISION OF NORMAL FIRST PREGNANCY: Status: RESOLVED | Noted: 2021-02-01 | Resolved: 2023-04-03

## 2023-04-03 LAB
ALBUMIN SERPL BCG-MCNC: 4.4 G/DL (ref 3.5–5.2)
ALP SERPL-CCNC: 90 U/L (ref 35–104)
ALT SERPL W P-5'-P-CCNC: 13 U/L (ref 10–35)
ANION GAP SERPL CALCULATED.3IONS-SCNC: 13 MMOL/L (ref 7–15)
AST SERPL W P-5'-P-CCNC: 17 U/L (ref 10–35)
BILIRUB SERPL-MCNC: 0.5 MG/DL
BUN SERPL-MCNC: 13.2 MG/DL (ref 6–20)
CALCIUM SERPL-MCNC: 9.7 MG/DL (ref 8.6–10)
CHLORIDE SERPL-SCNC: 101 MMOL/L (ref 98–107)
CHOLEST SERPL-MCNC: 239 MG/DL
CREAT SERPL-MCNC: 0.83 MG/DL (ref 0.51–0.95)
DEPRECATED HCO3 PLAS-SCNC: 25 MMOL/L (ref 22–29)
ERYTHROCYTE [DISTWIDTH] IN BLOOD BY AUTOMATED COUNT: 12 % (ref 10–15)
GFR SERPL CREATININE-BSD FRML MDRD: >90 ML/MIN/1.73M2
GLUCOSE SERPL-MCNC: 89 MG/DL (ref 70–99)
HCT VFR BLD AUTO: 41.5 % (ref 35–47)
HDLC SERPL-MCNC: 41 MG/DL
HGB BLD-MCNC: 14.3 G/DL (ref 11.7–15.7)
LDLC SERPL CALC-MCNC: 146 MG/DL
MCH RBC QN AUTO: 29.8 PG (ref 26.5–33)
MCHC RBC AUTO-ENTMCNC: 34.5 G/DL (ref 31.5–36.5)
MCV RBC AUTO: 87 FL (ref 78–100)
NONHDLC SERPL-MCNC: 198 MG/DL
PLATELET # BLD AUTO: 301 10E3/UL (ref 150–450)
POTASSIUM SERPL-SCNC: 4.4 MMOL/L (ref 3.4–5.3)
PROT SERPL-MCNC: 7.5 G/DL (ref 6.4–8.3)
RBC # BLD AUTO: 4.8 10E6/UL (ref 3.8–5.2)
SODIUM SERPL-SCNC: 139 MMOL/L (ref 136–145)
TRIGL SERPL-MCNC: 259 MG/DL
TSH SERPL DL<=0.005 MIU/L-ACNC: 1.59 UIU/ML (ref 0.3–4.2)
WBC # BLD AUTO: 10.4 10E3/UL (ref 4–11)

## 2023-04-03 PROCEDURE — 99395 PREV VISIT EST AGE 18-39: CPT | Performed by: FAMILY MEDICINE

## 2023-04-03 PROCEDURE — 99213 OFFICE O/P EST LOW 20 MIN: CPT | Mod: 25 | Performed by: FAMILY MEDICINE

## 2023-04-03 PROCEDURE — 80061 LIPID PANEL: CPT | Performed by: FAMILY MEDICINE

## 2023-04-03 PROCEDURE — 85027 COMPLETE CBC AUTOMATED: CPT | Performed by: FAMILY MEDICINE

## 2023-04-03 PROCEDURE — 80053 COMPREHEN METABOLIC PANEL: CPT | Performed by: FAMILY MEDICINE

## 2023-04-03 PROCEDURE — 84443 ASSAY THYROID STIM HORMONE: CPT | Performed by: FAMILY MEDICINE

## 2023-04-03 PROCEDURE — 36415 COLL VENOUS BLD VENIPUNCTURE: CPT | Performed by: FAMILY MEDICINE

## 2023-04-03 ASSESSMENT — ENCOUNTER SYMPTOMS
HEARTBURN: 0
PALPITATIONS: 0
SORE THROAT: 0
NAUSEA: 0
DYSURIA: 0
HEADACHES: 0
BREAST MASS: 0
FEVER: 0
ARTHRALGIAS: 0
DIZZINESS: 0
DIARRHEA: 0
HEMATOCHEZIA: 0
NERVOUS/ANXIOUS: 0
CHILLS: 0
CONSTIPATION: 0
EYE PAIN: 0
WEAKNESS: 0
MYALGIAS: 0
JOINT SWELLING: 0
FREQUENCY: 0
COUGH: 0
SHORTNESS OF BREATH: 0
PARESTHESIAS: 0
ABDOMINAL PAIN: 0
HEMATURIA: 0

## 2023-04-03 NOTE — PROGRESS NOTES
SUBJECTIVE:   CC: Hali is an 32 year old who presents for preventive health visit.       4/3/2023     2:15 PM   Additional Questions   Roomed by Griselda VIEIRA CMA   Patient has been advised of split billing requirements and indicates understanding: Yes  Healthy Habits:     Getting at least 3 servings of Calcium per day:  Yes    Bi-annual eye exam:  Yes    Dental care twice a year:  Yes    Sleep apnea or symptoms of sleep apnea:  None    Diet:  Regular (no restrictions)    Frequency of exercise:  None    Taking medications regularly:  Yes    Medication side effects:  Not applicable    PHQ-2 Total Score: 0    Additional concerns today:  No      Today's PHQ-2 Score:       4/3/2023    11:46 AM   PHQ-2 ( 1999 [employer])   Q1: Little interest or pleasure in doing things 0   Q2: Feeling down, depressed or hopeless 0   PHQ-2 Score 0   Q1: Little interest or pleasure in doing things Not at all    Not at all   Q2: Feeling down, depressed or hopeless Not at all    Not at all   PHQ-2 Score 0    0       Have you ever done Advance Care Planning? (For example, a Health Directive, POLST, or a discussion with a medical provider or your loved ones about your wishes): No, advance care planning information given to patient to review.  Advanced care planning was discussed at today's visit.    Social History     Tobacco Use     Smoking status: Passive Smoke Exposure - Never Smoker     Smokeless tobacco: Never     Tobacco comments:     fiance smokes.   Vaping Use     Vaping status: Not on file   Substance Use Topics     Alcohol use: Yes     Alcohol/week: 2.0 - 6.0 standard drinks of alcohol     Comment: occ         4/3/2023    11:46 AM   Alcohol Use   Prescreen: >3 drinks/day or >7 drinks/week? No          View : No data to display.              Reviewed orders with patient.  Reviewed health maintenance and updated orders accordingly - Yes  Lab work is in process  Labs reviewed in EPIC    Breast Cancer Screening:    FHS-7:       4/3/2023     11:47 AM   Breast CA Risk Assessment (FHS-7)   Did any of your first-degree relatives have breast or ovarian cancer? No   Did any of your relatives have bilateral breast cancer? No   Did any man in your family have breast cancer? No   Did any woman in your family have breast and ovarian cancer? Yes   Did any woman in your family have breast cancer before age 50 y? No   Do you have 2 or more relatives with breast and/or ovarian cancer? No   Do you have 2 or more relatives with breast and/or bowel cancer? No     Pertinent mammograms are reviewed under the imaging tab.    History of abnormal Pap smear: NO - age 30-65 PAP every 5 years with negative HPV co-testing recommended      Latest Ref Rng & Units 1/4/2021     4:21 PM 1/4/2021     4:15 PM 6/12/2017     2:39 PM   PAP / HPV   PAP    Negative for squamous intraepithelial lesion or malignancy  Electronically signed by Lily Hernandes CT (ASCP) on 6/20/2017 at  4:22 PM       PAP (Historical)  NIL       HPV 16 DNA NEG^Negative  Negative      HPV 18 DNA NEG^Negative  Negative      Other HR HPV NEG^Negative  Negative        Reviewed and updated as needed this visit by clinical staff   Tobacco  Allergies  Meds  Problems  Med Hx  Surg Hx  Fam Hx          Reviewed and updated as needed this visit by Provider   Tobacco  Allergies  Meds  Problems  Med Hx  Surg Hx  Fam Hx             Review of Systems   Constitutional: Negative for chills and fever.   HENT: Negative for congestion, ear pain, hearing loss and sore throat.    Eyes: Negative for pain and visual disturbance.   Respiratory: Negative for cough and shortness of breath.    Cardiovascular: Negative for chest pain, palpitations and peripheral edema.   Gastrointestinal: Negative for abdominal pain, constipation, diarrhea, heartburn, hematochezia and nausea.   Breasts:  Negative for tenderness, breast mass and discharge.   Genitourinary: Negative for dysuria, frequency, genital sores, hematuria, pelvic pain,  "urgency, vaginal bleeding and vaginal discharge.   Musculoskeletal: Negative for arthralgias, joint swelling and myalgias.   Skin: Negative for rash.   Neurological: Negative for dizziness, weakness, headaches and paresthesias.   Psychiatric/Behavioral: Negative for mood changes. The patient is not nervous/anxious.         OBJECTIVE:   /74   Pulse 85   Ht 1.575 m (5' 2\")   Wt 87.1 kg (192 lb)   LMP 03/20/2023 (Approximate)   SpO2 98%   BMI 35.12 kg/m    Physical Exam  GENERAL: healthy, alert and no distress  EYES: Eyes grossly normal to inspection, PERRL and conjunctivae and sclerae normal  HENT: ear canals and TM's normal, nose and mouth without ulcers or lesions  NECK: no adenopathy, no asymmetry, masses, or scars, right thyroid lobe enlargement  RESP: lungs clear to auscultation - no rales, rhonchi or wheezes  CV: regular rate and rhythm, normal S1 S2, no S3 or S4, no murmur  ABDOMEN: soft, nontender  MS: no gross musculoskeletal defects noted, no edema  SKIN: no suspicious lesions or rashes  NEURO: Normal strength and tone, mentation intact and speech normal  PSYCH: mentation appears normal, affect normal/bright    Diagnostic Test Results:  Labs reviewed in Epic    ASSESSMENT/PLAN:   Hali was seen today for physical.    Diagnoses and all orders for this visit:    Routine general medical examination at a health care facility  COUNSELING:  Reviewed preventive health counseling, as reflected in patient instructions    She reports that she is a non-smoker but has been exposed to tobacco smoke. She has never used smokeless tobacco.  -     Comprehensive metabolic panel (BMP + Alb, Alk Phos, ALT, AST, Total. Bili, TP); Future  -     CBC with platelets; Future    Screening for lipid disorders  -     Lipid Profile (Chol, Trig, HDL, LDL calc); Future    Enlarged thyroid  Right thyroid lobe enlargement noted on exam today.  Will evaluate with TSH and thyroid ultrasound.  Of note, patient's mother has had " thyroid abnormalities in the past.  -     TSH with free T4 reflex; Future  -     US Thyroid; Future    Class 2 obesity due to excess calories without serious comorbidity with body mass index (BMI) of 35.0 to 35.9 in adult  Body mass index is 35.12 kg/m . Discussed importance of weight loss through healthy diet and exercise.  Information provided regarding the Mediterranean diet.  She will consider nutrition referral which was placed.  -     Nutrition Referral; Future        Patient has been advised of split billing requirements and indicates understanding: Yes    Lacey Koroma DO  [facility]

## 2023-04-03 NOTE — PATIENT INSTRUCTIONS
[phone] to schedule thyroid ultrasound        Preventive Health Recommendations  Female Ages 26 - 39  Yearly exam:   See your health care provider every year in order to  Review health changes.   Discuss preventive care.    Review your medicines if you your doctor has prescribed any.    Until age 30: Get a Pap test every three years (more often if you have had an abnormal result).    After age 30: Talk to your doctor about whether you should have a Pap test every 3 years or have a Pap test with HPV screening every 5 years.   You do not need a Pap test if your uterus was removed (hysterectomy) and you have not had cancer.  You should be tested each year for STDs (sexually transmitted diseases), if you're at risk.   Talk to your provider about how often to have your cholesterol checked.  If you are at risk for diabetes, you should have a diabetes test (fasting glucose).  Shots: Get a flu shot each year. Get a tetanus shot every 10 years.   Nutrition:   Eat at least 5 servings of fruits and vegetables each day.  Eat whole-grain bread, whole-wheat pasta and brown rice instead of white grains and rice.  Get adequate Calcium and Vitamin D.     Lifestyle  Exercise at least 150 minutes a week (30 minutes a day, 5 days of the week). This will help you control your weight and prevent disease.  Limit alcohol to one drink per day.  No smoking.   Wear sunscreen to prevent skin cancer.  See your dentist every six months for an exam and cleaning.

## 2023-04-04 ENCOUNTER — HOSPITAL ENCOUNTER (OUTPATIENT)
Dept: ULTRASOUND IMAGING | Facility: HOSPITAL | Age: 33
Discharge: HOME | End: 2023-04-04
Attending: FAMILY MEDICINE | Admitting: FAMILY MEDICINE
Payer: COMMERCIAL

## 2023-04-04 DIAGNOSIS — E04.9 ENLARGED THYROID: ICD-10-CM

## 2023-04-04 PROCEDURE — 76536 US EXAM OF HEAD AND NECK: CPT

## 2023-09-10 ENCOUNTER — NURSE TRIAGE (OUTPATIENT)
Dept: NURSING | Facility: CLINIC | Age: 33
End: 2023-09-10
Payer: COMMERCIAL

## 2023-09-10 NOTE — TELEPHONE ENCOUNTER
OB Triage Call      Is patient's OB/Midwife with the [facility] or [facility]? [facility]- Proceed with triage     Reason for call: Patient is 9 weeks pregnant and had an episode of vaginal bleeding/spotting today. After urination, patient saw pinkish-red blood on the toilet paper each of the 3 times she wiped    Assessment:   One episode of vaginal spotting  No abdominal pain or cramping  No unusual vaginal odor or discharge  No fever  Has been traveling in the car several hours today, more stressful  Has an ocular headache - common for her when stressed    Plan: Home care - monitor for any further spotting or bleeding. Call back if bleeding worsens or persists more than 48 hours or if spotting occurs twice in one week.    Patient's primary OB Provider is not yet established.      Per protocol recommendations Patient to follow home care recommendations.       Is patient's delivering hospital on divert? Does not apply due to Patient given home care instructions      Unknown    Estimated Date of Delivery: Data Unavailable        OB History    Para Term  AB Living   1 1 0 1 0 1   SAB IAB Ectopic Multiple Live Births   0 0 0 0 1      # Outcome Date GA Lbr Srikanth/2nd Weight Sex Delivery Anes PTL Lv   1  07/10/21 35w0d   F    [facility]      Name: KATHLEEN,FEMALE-KIM      Obstetric Comments   It's a GIRL!   FOB is [place]       No results found for: GBS       Sunita Penaloza RN 09/10/23 3:01 PM  [facility] Nurse Advisor    Reason for Disposition   SPOTTING (single or brief episode)    Additional Information   Negative: Shock suspected (e.g., cold/pale/clammy skin, too weak to stand, low BP, rapid pulse)   Negative: Difficult to awaken or acting confused (e.g., disoriented, slurred speech)   Negative: Passed out (i.e., lost consciousness, collapsed and was not responding)   Negative: Sounds like a life-threatening emergency to the triager   Negative: [1] Vaginal bleeding AND [2] pregnant 20 or more  "weeks   Negative: Not pregnant or pregnancy status unknown   Negative: SPOTTING after sexual intercourse (single or brief episode)   Negative: SPOTTING after a pelvic examination (single or brief episode)   Negative: Not feeling pregnant any longer (e.g., breast tenderness or nausea has disappeared)   Negative: MILD vaginal bleeding (i.e., less than 1 pad / hour; less than patient's usual menstrual bleeding; not just spotting)   Negative: SPOTTING lasts > 48 hours or spotting happens more than once in a week   Negative: Unusual vaginal discharge (e.g., bad smelling, yellow, green, or foamy-white)   Negative: MODERATE vaginal bleeding (e.g., soaking 1 pad per hour; clots)   Negative: Has IUD   Negative: [1] Intermittent lower abdominal pain (e.g., cramping) AND [2] present > 24 hours   Negative: Prior history of \"ectopic pregnancy\" or previous tubal surgery (e.g., tubal ligation)   Negative: Pain or burning with passing urine (urination)   Negative: Using heparin (e.g., Lovenox) or other strong blood thinner, or known bleeding disorder (e.g., thrombocytopenia)   Negative: [1] Constant abdominal pain AND [2] present > 2 hours   Negative: Fever 100.4 F (38.0 C) or higher   Negative: Pale skin (pallor) of new-onset or worsening   Negative: Patient sounds very sick or weak to the triager   Negative: SEVERE abdominal pain   Negative: SEVERE vaginal bleeding (e.g., soaking 2 pads per hour or large blood clots and present 2 or more hours)   Negative: SEVERE dizziness (e.g., unable to stand, requires support to walk, feels like passing out)   Negative: [1] MODERATE vaginal bleeding (e.g., soaking 1 pad per hour; clots) AND [2] present > 6 hours   Negative: [1] MODERATE vaginal bleeding (e.g., soaking 1 pad per hour; clots) AND [2] pregnant > 12 weeks   Negative: Passed tissue (e.g., gray-white)   Negative: Shoulder pain    Protocols used: Pregnancy - Vaginal Bleeding Less Than 20 Weeks EGA-A-AH    "

## 2023-09-27 ENCOUNTER — LAB REQUISITION (OUTPATIENT)
Dept: LAB | Facility: CLINIC | Age: 33
End: 2023-09-27
Payer: COMMERCIAL

## 2023-09-27 DIAGNOSIS — Z36.89 ENCOUNTER FOR OTHER SPECIFIED ANTENATAL SCREENING: ICD-10-CM

## 2023-09-27 LAB
BASOPHILS # BLD AUTO: 0.1 10E3/UL (ref 0–0.2)
BASOPHILS NFR BLD AUTO: 0 %
EOSINOPHIL # BLD AUTO: 0.1 10E3/UL (ref 0–0.7)
EOSINOPHIL NFR BLD AUTO: 1 %
ERYTHROCYTE [DISTWIDTH] IN BLOOD BY AUTOMATED COUNT: 12.8 % (ref 10–15)
HCT VFR BLD AUTO: 40.2 % (ref 35–47)
HGB BLD-MCNC: 13.6 G/DL (ref 11.7–15.7)
IMM GRANULOCYTES # BLD: 0.1 10E3/UL
IMM GRANULOCYTES NFR BLD: 1 %
LYMPHOCYTES # BLD AUTO: 3.1 10E3/UL (ref 0.8–5.3)
LYMPHOCYTES NFR BLD AUTO: 22 %
MCH RBC QN AUTO: 29.7 PG (ref 26.5–33)
MCHC RBC AUTO-ENTMCNC: 33.8 G/DL (ref 31.5–36.5)
MCV RBC AUTO: 88 FL (ref 78–100)
MONOCYTES # BLD AUTO: 0.5 10E3/UL (ref 0–1.3)
MONOCYTES NFR BLD AUTO: 4 %
NEUTROPHILS # BLD AUTO: 10.1 10E3/UL (ref 1.6–8.3)
NEUTROPHILS NFR BLD AUTO: 72 %
NRBC # BLD AUTO: 0 10E3/UL
NRBC BLD AUTO-RTO: 0 /100
PLATELET # BLD AUTO: 340 10E3/UL (ref 150–450)
RBC # BLD AUTO: 4.58 10E6/UL (ref 3.8–5.2)
WBC # BLD AUTO: 13.9 10E3/UL (ref 4–11)

## 2023-09-27 PROCEDURE — 86850 RBC ANTIBODY SCREEN: CPT | Mod: ORL | Performed by: NURSE PRACTITIONER

## 2023-09-27 PROCEDURE — 87591 N.GONORRHOEAE DNA AMP PROB: CPT | Mod: ORL | Performed by: NURSE PRACTITIONER

## 2023-09-27 PROCEDURE — 86780 TREPONEMA PALLIDUM: CPT | Mod: ORL | Performed by: NURSE PRACTITIONER

## 2023-09-27 PROCEDURE — 85025 COMPLETE CBC W/AUTO DIFF WBC: CPT | Mod: ORL | Performed by: NURSE PRACTITIONER

## 2023-09-27 PROCEDURE — 87389 HIV-1 AG W/HIV-1&-2 AB AG IA: CPT | Mod: ORL | Performed by: NURSE PRACTITIONER

## 2023-09-27 PROCEDURE — 86762 RUBELLA ANTIBODY: CPT | Mod: ORL | Performed by: NURSE PRACTITIONER

## 2023-09-27 PROCEDURE — 86901 BLOOD TYPING SEROLOGIC RH(D): CPT | Mod: ORL | Performed by: NURSE PRACTITIONER

## 2023-09-27 PROCEDURE — 87340 HEPATITIS B SURFACE AG IA: CPT | Mod: ORL | Performed by: NURSE PRACTITIONER

## 2023-09-27 PROCEDURE — 87491 CHLMYD TRACH DNA AMP PROBE: CPT | Mod: ORL | Performed by: NURSE PRACTITIONER

## 2023-09-27 PROCEDURE — 87086 URINE CULTURE/COLONY COUNT: CPT | Mod: ORL | Performed by: NURSE PRACTITIONER

## 2023-09-27 PROCEDURE — 86803 HEPATITIS C AB TEST: CPT | Mod: ORL | Performed by: NURSE PRACTITIONER

## 2023-09-28 LAB
ABO/RH(D): NORMAL
ANTIBODY SCREEN: NEGATIVE
C TRACH DNA SPEC QL PROBE+SIG AMP: NEGATIVE
HBV SURFACE AG SERPL QL IA: NONREACTIVE
HCV AB SERPL QL IA: NONREACTIVE
HIV 1+2 AB+HIV1 P24 AG SERPL QL IA: NONREACTIVE
N GONORRHOEA DNA SPEC QL NAA+PROBE: NEGATIVE
RUBV IGG SERPL QL IA: 19.3 INDEX
RUBV IGG SERPL QL IA: POSITIVE
SPECIMEN EXPIRATION DATE: NORMAL
T PALLIDUM AB SER QL: NONREACTIVE

## 2023-09-29 LAB — BACTERIA UR CULT: NORMAL

## 2023-11-01 ENCOUNTER — LAB REQUISITION (OUTPATIENT)
Dept: LAB | Facility: CLINIC | Age: 33
End: 2023-11-01
Payer: COMMERCIAL

## 2023-11-01 DIAGNOSIS — Z12.4 ENCOUNTER FOR SCREENING FOR MALIGNANT NEOPLASM OF CERVIX: ICD-10-CM

## 2023-11-01 PROCEDURE — G0145 SCR C/V CYTO,THINLAYER,RESCR: HCPCS | Mod: ORL | Performed by: OBSTETRICS & GYNECOLOGY

## 2023-11-05 LAB
BKR LAB AP GYN ADEQUACY: NORMAL
BKR LAB AP GYN INTERPRETATION: NORMAL
BKR LAB AP HPV REFLEX: NORMAL
BKR LAB AP LMP: NORMAL
BKR LAB AP PREVIOUS ABNL DX: NORMAL
BKR LAB AP PREVIOUS ABNORMAL: NORMAL
PATH REPORT.COMMENTS IMP SPEC: NORMAL
PATH REPORT.COMMENTS IMP SPEC: NORMAL
PATH REPORT.RELEVANT HX SPEC: NORMAL

## 2023-11-24 ENCOUNTER — HOSPITAL ENCOUNTER (EMERGENCY)
Facility: HOSPITAL | Age: 33
Discharge: HOME | End: 2023-11-24
Attending: EMERGENCY MEDICINE | Admitting: EMERGENCY MEDICINE
Payer: COMMERCIAL

## 2023-11-24 ENCOUNTER — APPOINTMENT (OUTPATIENT)
Dept: ULTRASOUND IMAGING | Facility: HOSPITAL | Age: 33
End: 2023-11-24
Attending: PHYSICIAN ASSISTANT
Payer: COMMERCIAL

## 2023-11-24 ENCOUNTER — APPOINTMENT (OUTPATIENT)
Dept: ULTRASOUND IMAGING | Facility: HOSPITAL | Age: 33
End: 2023-11-24
Attending: EMERGENCY MEDICINE
Payer: COMMERCIAL

## 2023-11-24 ENCOUNTER — APPOINTMENT (OUTPATIENT)
Dept: MRI IMAGING | Facility: HOSPITAL | Age: 33
End: 2023-11-24
Attending: PHYSICIAN ASSISTANT
Payer: COMMERCIAL

## 2023-11-24 VITALS
RESPIRATION RATE: 20 BRPM | SYSTOLIC BLOOD PRESSURE: 110 MMHG | HEIGHT: 62 IN | DIASTOLIC BLOOD PRESSURE: 65 MMHG | HEART RATE: 105 BPM | BODY MASS INDEX: 36.12 KG/M2 | TEMPERATURE: 98.1 F | WEIGHT: 196.3 LBS | OXYGEN SATURATION: 98 %

## 2023-11-24 DIAGNOSIS — R10.9 LEFT FLANK PAIN: ICD-10-CM

## 2023-11-24 DIAGNOSIS — Z34.92 SECOND TRIMESTER PREGNANCY: ICD-10-CM

## 2023-11-24 LAB
ALBUMIN SERPL BCG-MCNC: 4 G/DL (ref 3.5–5.2)
ALBUMIN UR-MCNC: NEGATIVE MG/DL
ALP SERPL-CCNC: 84 U/L (ref 40–150)
ALT SERPL W P-5'-P-CCNC: 14 U/L (ref 0–50)
ANION GAP SERPL CALCULATED.3IONS-SCNC: 12 MMOL/L (ref 7–15)
APPEARANCE UR: CLEAR
AST SERPL W P-5'-P-CCNC: 14 U/L (ref 0–45)
BACTERIA #/AREA URNS HPF: ABNORMAL /HPF
BASOPHILS # BLD AUTO: 0 10E3/UL (ref 0–0.2)
BASOPHILS NFR BLD AUTO: 0 %
BILIRUB DIRECT SERPL-MCNC: <0.2 MG/DL (ref 0–0.3)
BILIRUB SERPL-MCNC: 0.3 MG/DL
BILIRUB UR QL STRIP: NEGATIVE
BUN SERPL-MCNC: 8 MG/DL (ref 6–20)
CALCIUM SERPL-MCNC: 9.9 MG/DL (ref 8.6–10)
CHLORIDE SERPL-SCNC: 101 MMOL/L (ref 98–107)
COLOR UR AUTO: COLORLESS
CREAT SERPL-MCNC: 0.6 MG/DL (ref 0.51–0.95)
CRP SERPL-MCNC: 16.5 MG/L
DEPRECATED HCO3 PLAS-SCNC: 23 MMOL/L (ref 22–29)
EGFRCR SERPLBLD CKD-EPI 2021: >90 ML/MIN/1.73M2
EOSINOPHIL # BLD AUTO: 0.1 10E3/UL (ref 0–0.7)
EOSINOPHIL NFR BLD AUTO: 1 %
ERYTHROCYTE [DISTWIDTH] IN BLOOD BY AUTOMATED COUNT: 13.3 % (ref 10–15)
GLUCOSE SERPL-MCNC: 88 MG/DL (ref 70–99)
GLUCOSE UR STRIP-MCNC: NEGATIVE MG/DL
HCT VFR BLD AUTO: 40.1 % (ref 35–47)
HGB BLD-MCNC: 13.9 G/DL (ref 11.7–15.7)
HGB UR QL STRIP: NEGATIVE
IMM GRANULOCYTES # BLD: 0.1 10E3/UL
IMM GRANULOCYTES NFR BLD: 1 %
KETONES UR STRIP-MCNC: NEGATIVE MG/DL
LACTATE SERPL-SCNC: 0.9 MMOL/L (ref 0.7–2)
LEUKOCYTE ESTERASE UR QL STRIP: NEGATIVE
LIPASE SERPL-CCNC: 36 U/L (ref 13–60)
LYMPHOCYTES # BLD AUTO: 3 10E3/UL (ref 0.8–5.3)
LYMPHOCYTES NFR BLD AUTO: 18 %
MCH RBC QN AUTO: 29.9 PG (ref 26.5–33)
MCHC RBC AUTO-ENTMCNC: 34.7 G/DL (ref 31.5–36.5)
MCV RBC AUTO: 86 FL (ref 78–100)
MONOCYTES # BLD AUTO: 0.5 10E3/UL (ref 0–1.3)
MONOCYTES NFR BLD AUTO: 3 %
NEUTROPHILS # BLD AUTO: 13.4 10E3/UL (ref 1.6–8.3)
NEUTROPHILS NFR BLD AUTO: 77 %
NITRATE UR QL: NEGATIVE
NRBC # BLD AUTO: 0 10E3/UL
NRBC BLD AUTO-RTO: 0 /100
PH UR STRIP: 5.5 [PH] (ref 5–7)
PLATELET # BLD AUTO: 288 10E3/UL (ref 150–450)
POTASSIUM SERPL-SCNC: 4.1 MMOL/L (ref 3.4–5.3)
PROT SERPL-MCNC: 7.3 G/DL (ref 6.4–8.3)
RBC # BLD AUTO: 4.65 10E6/UL (ref 3.8–5.2)
RBC URINE: 1 /HPF
SODIUM SERPL-SCNC: 136 MMOL/L (ref 135–145)
SP GR UR STRIP: 1.01 (ref 1–1.03)
SQUAMOUS EPITHELIAL: <1 /HPF
UROBILINOGEN UR STRIP-MCNC: <2 MG/DL
WBC # BLD AUTO: 17.3 10E3/UL (ref 4–11)
WBC URINE: 2 /HPF

## 2023-11-24 PROCEDURE — 81001 URINALYSIS AUTO W/SCOPE: CPT | Performed by: EMERGENCY MEDICINE

## 2023-11-24 PROCEDURE — 36415 COLL VENOUS BLD VENIPUNCTURE: CPT | Performed by: PHYSICIAN ASSISTANT

## 2023-11-24 PROCEDURE — 82248 BILIRUBIN DIRECT: CPT | Performed by: EMERGENCY MEDICINE

## 2023-11-24 PROCEDURE — 258N000003 HC RX IP 258 OP 636: Performed by: PHYSICIAN ASSISTANT

## 2023-11-24 PROCEDURE — 76805 OB US >/= 14 WKS SNGL FETUS: CPT

## 2023-11-24 PROCEDURE — 76770 US EXAM ABDO BACK WALL COMP: CPT

## 2023-11-24 PROCEDURE — 85025 COMPLETE CBC W/AUTO DIFF WBC: CPT | Performed by: EMERGENCY MEDICINE

## 2023-11-24 PROCEDURE — 74181 MRI ABDOMEN W/O CONTRAST: CPT

## 2023-11-24 PROCEDURE — 83690 ASSAY OF LIPASE: CPT | Performed by: EMERGENCY MEDICINE

## 2023-11-24 PROCEDURE — 36415 COLL VENOUS BLD VENIPUNCTURE: CPT | Performed by: EMERGENCY MEDICINE

## 2023-11-24 PROCEDURE — 96360 HYDRATION IV INFUSION INIT: CPT

## 2023-11-24 PROCEDURE — 83605 ASSAY OF LACTIC ACID: CPT | Performed by: PHYSICIAN ASSISTANT

## 2023-11-24 PROCEDURE — 87086 URINE CULTURE/COLONY COUNT: CPT | Performed by: PHYSICIAN ASSISTANT

## 2023-11-24 PROCEDURE — 99285 EMERGENCY DEPT VISIT HI MDM: CPT | Mod: 25

## 2023-11-24 PROCEDURE — 86140 C-REACTIVE PROTEIN: CPT | Performed by: EMERGENCY MEDICINE

## 2023-11-24 RX ADMIN — SODIUM CHLORIDE 1000 ML: 9 INJECTION, SOLUTION INTRAVENOUS at 15:58

## 2023-11-24 ASSESSMENT — ACTIVITIES OF DAILY LIVING (ADL)
ADLS_ACUITY_SCORE: 35
ADLS_ACUITY_SCORE: 35
ADLS_ACUITY_SCORE: 33

## 2023-11-24 NOTE — ED TRIAGE NOTES
Patient 16 weeks 4 days pregnant and started experiencing some left lower abdominal cramping that started last night about 730pm.  Denies nausea/vomiting.  Also endorses left side back pain. Denies any urination problems.

## 2023-11-24 NOTE — ED PROVIDER NOTES
Emergency Department Encounter   NAME: Hali Flower ; AGE: 33 year old female ; YOB: 1990 ; MRN: [id number] ; PCP: No Ref-Primary, Physician   ED PROVIDER: Jane Matias PA-C    Evaluation Date & Time:   2023 12:58 PM    CHIEF COMPLAINT:  Abdominal Pain      Impression and Plan   MDM:   Hali Flower is a 33 year old female with no relevant medical history who presents to the ED by walking in for evaluation of abdominal pain. Patient is , and is currently ~16 weeks pregnant and follows with Dr. Kerr, [facility]. She presented to the emergency department today with some left lower quadrant abdominal pressure that started yesterday and is now in her left flank today.  She called the nurse triage line and was referred to the ER.  Upon arrival, she is very pleasant, well-appearing, and in no acute distress.  Sitting upright in the bed and has no reproducible tenderness to her abdomen which is gravid and there is certainly no evidence of peritonitis.  She was initially mildly tachycardic -plan to reassess this, although suspect it is likely due to her pregnant state on top of some anxiety recurrent ER visit.  Otherwise vitally stable.  She is not having any vaginal bleeding, spotting, or discharge.  She states that she just had an ultrasound on Monday which is reassuring.  Given new discomfort, we will repeat an ultrasound today though she is not describing contraction-like discomfort and is not having any spotting or drainage which is reassuring.  We will also assess laboratory work-up, urine, and left-sided renal ultrasound to rule out nephrolithiasis.  Patient declined anything for pain as she reports just a mild pressure rating a 2 out of 10 at this time.    Ultrasound returned reassuring with single living intrauterine gestation with a heart rate of 169.  Cervical length at 3.3.  Ovaries with normal appearance.  Ultrasound of the left kidney unremarkable without evidence of  hydronephrosis to suggest an obstructive uropathy.  Her labs were notable for a WBC of 17.3.  She has not had any fevers or infectious symptoms, and may be somewhat elevated due to her pregnancy, was elevated 1 month ago at 13.9 when she had her intake prenatal labs.  Given this and her mild elevation in heart rate, did obtain a lactate which returned within normal limits.  Vitals otherwise remained reassuring.  Nothing to suggest severe sepsis or shock.  Her UA shows no nitrates, leukocytes, or WBCs to suggest infection.  Bacteria is seen in the urine which may be due to to contamination though no significant squamous epithelial cells seen.  As she is pregnant, will send urine for culture to rule out asymptomatic bacteriuria.  Nothing to suggest pyelonephritis or UTI.  Given her elevated WBC and unclear source of her left flank pain, did discuss MRI with her which she wanted to move forward with.  MRI returned unremarkable without secondary source of symptoms such as diverticulitis, colitis, intra-abdominal abscess, perinephric stranding.  Suspect pain may be musculoskeletal in nature to her left flank.  No longer has abdominal pain, but could have been due to round ligament discomfort.  Advise monitoring for symptoms of impending miscarriage, pelvic rest, close follow-up with her OB in clinic next week, and strict return precautions.  Patient and significant other feel comfortable with this plan and she was discharged home in stable condition.    Medical Decision Making    History:  Supplemental history from:  Yes - .   External Record(s) reviewed: Documented in chart, if applicable.    Work Up:  Chart documentation includes differential considered and any EKGs or imaging independently interpreted by provider, where specified.  In additional to work up documented, I considered the following work up: Documented in chart, if applicable.    External consultation:  Discussion of management with another  "provider: Documented in chart, if applicable    Complicating factors:  Care impacted by chronic illness: N/A  Care affected by social determinants of health: Access to care     Disposition considerations: Discharge. No recommendations on prescription strength medication(s). See documentation for any additional details.      ED COURSE:  1:07 PM I met and introduced myself to the patient. I gathered initial history and performed my physical exam. We discussed plan for initial workup. Patient declined Tylenol medication.  3:30 PM I have staffed the patient with Dr. Dunbar, ED MD, who has evaluated the patient and agrees with all aspects of today's care.  3:45 PM Dr. Dunbar updated the patient on the plan.   5:30 PM Rechecked the patient and updated her.    6:18 PM  I rechecked the patient and discussed results, discharge, follow up, and reasons to return to the ED.     At the conclusion of the encounter I discussed the results of all the tests and the disposition. The questions were answered. The patient or family acknowledged understanding and was agreeable with the care plan.    FINAL IMPRESSION:    ICD-10-CM    1. Left flank pain  R10.9       2. Second trimester pregnancy  Z34.92             MEDICATIONS GIVEN IN THE EMERGENCY DEPARTMENT:  Medications   sodium chloride 0.9% BOLUS 1,000 mL (1,000 mLs Intravenous $New Bag 23 [id number])         NEW PRESCRIPTIONS STARTED AT TODAY'S ED VISIT:  New Prescriptions    No medications on file         HPI   Patient information was obtained from: patient.   Use of Intrepreter: N/A     Hali Flower is a 33 year old female with no relevant medical history who presents to the ED by walking in for evaluation of abdominal pain.    Patient is 16 weeks and 4 days pregnant. . Patient presents to the ED due to an intermittent left lower abdominal pain, which started last night. She says, \"it feels like a menstrual cramps not tightness, and it is more like a dull ache not " "contraction\". She reports a 2/10 pain severity now. She notes that now it is more in her left lower back. Denies vaginal bleeding, vaginal discharge, frequency, urgency, fever, nausea, and vomiting. She reports a looser stool, but not diarrhea. She notes that it is most likely due to eating more than usual last night for the Thanksgiving holiday. Of note, patient's last US was on Monday, and it was normal.     Patient has history of 1  with her first child. No history ogf kidney stone or kidney infection.       REVIEW OF SYSTEMS:  Pertinent positive and negative symptoms per HPI.       Medical History     Past Medical History:   Diagnosis Date    Other malaise and fatigue     Transverse lie of fetus 2021       Past Surgical History:   Procedure Laterality Date     SECTION N/A 7/10/2021    Procedure:  SECTION;  Surgeon: Mira Kerr DO;  Location: [facility] L[facility] OR;  Service: Obstetrics    WISDOM TOOTH EXTRACTION         Family History   Problem Relation Age of Onset    Cancer Mother         skin    Cancer Maternal Grandmother         breast    Cancer Maternal Grandfather         lung    Diabetes Paternal Grandfather     Breast Cancer Maternal Grandmother     Thyroid Disease Mother     Skin Cancer Mother     Ovarian Cancer Other     Lung Cancer Maternal Grandfather        Social History     Tobacco Use    Smoking status: Passive Smoke Exposure - Never Smoker    Smokeless tobacco: Never    Tobacco comments:     fiance smokes.   Substance Use Topics    Alcohol use: Yes     Alcohol/week: 2.0 - 6.0 standard drinks of alcohol     Comment: occ    Drug use: No       No current outpatient medications on file.        Physical Exam     First Vitals:  Patient Vitals for the past 24 hrs:   BP Temp Temp src Pulse Resp SpO2 Height Weight   23 1759 124/70 -- -- 104 -- 98 % -- --   23 1700 121/79 -- -- 102 -- 100 % -- --   23 1646 129/83 -- -- 105 -- 100 % -- --   23 " "1631 130/83 -- -- 97 -- 100 % -- --   11/24/23 1616 134/87 -- -- 101 -- 99 % -- --   11/24/23 1556 132/80 -- -- 109 -- 100 % -- --   11/24/23 1545 118/82 -- -- 109 -- 99 % -- --   11/24/23 1530 119/78 -- -- 100 -- 99 % -- --   11/24/23 1515 120/76 -- -- 107 -- 99 % -- --   11/24/23 1500 118/76 -- -- 104 -- 97 % -- --   11/24/23 1201 131/87 98.1  F (36.7  C) Oral 111 20 95 % 1.575 m (5' 2\") 89 kg (196 lb 4.8 oz)         PHYSICAL EXAM:   Physical Exam  Vitals and nursing note reviewed.   Constitutional:       General: She is not in acute distress.     Appearance: She is well-developed. She is not ill-appearing, toxic-appearing or diaphoretic.   Abdominal:      Comments: Gravid abdomen.  Abdomen is soft without any reproducible tenderness and there is no rebound, guarding, or evidence of peritonitis.  Mild discomfort over left flank.  No bruising or rashes.   Neurological:      Mental Status: She is alert.             Results     LAB:  All pertinent labs reviewed and interpreted  Labs Ordered and Resulted from Time of ED Arrival to Time of ED Departure   CRP INFLAMMATION - Abnormal       Result Value    CRP Inflammation 16.50 (*)    ROUTINE UA WITH MICROSCOPIC REFLEX TO CULTURE - Abnormal    Color Urine Colorless      Appearance Urine Clear      Glucose Urine Negative      Bilirubin Urine Negative      Ketones Urine Negative      Specific Gravity Urine 1.006      Blood Urine Negative      pH Urine 5.5      Protein Albumin Urine Negative      Urobilinogen Urine <2.0      Nitrite Urine Negative      Leukocyte Esterase Urine Negative      Bacteria Urine Many (*)     RBC Urine 1      WBC Urine 2      Squamous Epithelials Urine <1     CBC WITH PLATELETS AND DIFFERENTIAL - Abnormal    WBC Count 17.3 (*)     RBC Count 4.65      Hemoglobin 13.9      Hematocrit 40.1      MCV 86      MCH 29.9      MCHC 34.7      RDW 13.3      Platelet Count 288      % Neutrophils 77      % Lymphocytes 18      % Monocytes 3      % Eosinophils 1   "    % Basophils 0      % Immature Granulocytes 1      NRBCs per 100 WBC 0      Absolute Neutrophils 13.4 (*)     Absolute Lymphocytes 3.0      Absolute Monocytes 0.5      Absolute Eosinophils 0.1      Absolute Basophils 0.0      Absolute Immature Granulocytes 0.1      Absolute NRBCs 0.0     BASIC METABOLIC PANEL - Normal    Sodium 136      Potassium 4.1      Chloride 101      Carbon Dioxide (CO2) 23      Anion Gap 12      Urea Nitrogen 8.0      Creatinine 0.60      GFR Estimate >90      Calcium 9.9      Glucose 88     HEPATIC FUNCTION PANEL - Normal    Protein Total 7.3      Albumin 4.0      Bilirubin Total 0.3      Alkaline Phosphatase 84      AST 14      ALT 14      Bilirubin Direct <0.20     LIPASE - Normal    Lipase 36     LACTIC ACID WHOLE BLOOD - Normal    Lactic Acid 0.9     URINE CULTURE       RADIOLOGY:  MR Abdomen w/o Contrast   Final Result   IMPRESSION:   1.  No etiology for symptoms evident.      US Renal Complete Non-Vascular   Final Result   IMPRESSION:   1.  Normal kidney ultrasound.   2.  Normal kidney Doppler evaluation.      US OB > 14 Weeks   Final Result   IMPRESSION:     1.  No findings to suggest etiology of left lower quadrant pain on this targeted examination.   2.  Single live intrauterine gestation as described.   3.  Complete biometric assessment was not performed.          I, Jeniffer Martinez , am serving as a scribe to document services personally performed by Jane Matias PA-C, based on my observation and the provider's statements to me. I, Jane Matias PA-C attest that Jeniffer Martinez  is acting in a scribe capacity, has observed my performance of the services and has documented them in accordance with my direction.       Jane Matias PA-C   Emergency Medicine   [facility] EMERGENCY DEPARTMENT     Jane Matias PA-C  11/24/23 2050

## 2023-11-24 NOTE — ED NOTES
Complaining of intermittent LLQ abdominal pain that radiates to her back. Patient denies any difficulty with urination. Denies bleeding as well.

## 2023-11-24 NOTE — ED PROVIDER NOTES
"Emergency Department Staff Physician Note     I had a face to face encounter with this patient seen by the Advanced Practice Provider (ANNALISE).  I have seen, examined, and discussed the patient with the ANNALISE and agree with their assessment and plan of management.    Relevant HPI:     Hali Flower is a 33 year old female who presents to the Emergency Department for evaluation of abdominal pain.     Patient is 16 weeks and 4 days pregnant. . Reports intermittent LLQ pain, which started yesterday and now is more in the left lower back. Denies vaginal bleeding/discharge, frequency, urgency, nausea, vomiting, diarrhea, or any other symptoms.       I, Jeniffer Martinez, am serving as a scribe to document services personally performed by Wild Dunbar D.O., based on my observations and the provider's statements to me.   I, Wild Dunbar D.O., attest that Jeniffer Martinez was acting in a scribe capacity, has observed my performance of the services and has documented them in accordance with my direction.    ED Course:  3:30 PM I received the patient report from the ANNALISE, MELANIE Williamson. I agree with their assessment and plan of management, and I will see the patient.  3:40 PM I met with the patient to introduce myself, gather additional history, perform my initial exam, and discuss the plan.     Brief Physical Exam:  Vitals: /65   Pulse 105   Temp 98.1  F (36.7  C) (Oral)   Resp 20   Ht 1.575 m (5' 2\")   Wt 89 kg (196 lb 4.8 oz)   LMP 2023 (Approximate)   SpO2 98%   BMI 35.90 kg/m    General: Appears in no acute distress, awake, alert, interactive.  Eyes: Conjunctivae non-injected. Sclera anicteric.  HENT: Atraumatic, normocephalic  Neck: Supple, normal ROM  Respiratory/Chest: Respiration unlabored, no tachypnea  Abdomen: non distended  Musculoskeletal: Normal extremities. No edema or erythema.  Skin: Normal color. No rash or diaphoresis.  Neurologic: Alert and oriented, face symmetric, moves all extremities " spontaneously. Speech clear.  Psychiatric:  Affect normal, Judgment normal, Mood normal.           Impression / ED Plan:  I had a face to face encounter with this patient seen by the Advanced Practice Provider (ANNALISE).  I have seen, examined, and discussed the patient with the ANNALISE and agree with their assessment and plan of management. I personally saw the patient and performed a substantive portion of the visit including all aspects of the medical decision making.    Hali Flower is a 33 year old female presents to the ED for evaluation of left-sided flank pain.  Patient is also currently 16 weeks pregnant.  Ultrasound of the left kidney and of the baby both were unremarkable.  No evidence of hydronephrosis that would be suspicious for obstructive uropathy.  There is no definitive evidence of infection.  However we cannot fully rule out intra-abdominal infection therefore we did decide to perform an MRI as this would be most prudent and safe for the baby.  No evidence of acute intra-abdominal pathology that could easily explain patient's symptoms.  Therefore at this time, as the patient's symptoms are improving, and we are not find obvious emergent cause, will refer back to her primary OB/GYN.  Patient was comfortable with this plan.  Return precautions were discussed.    1. Left flank pain    2. Second trimester pregnancy        Wild Dunbar D.O.  Emergency Medicine  [facility] EMERGENCY DEPARTMENT  [address] [place] [address] [phone]  Dept: [phone]     Wild Dunbar,   11/24/23 0438

## 2023-11-25 LAB — BACTERIA UR CULT: NORMAL

## 2023-11-25 NOTE — DISCHARGE INSTRUCTIONS
As we discussed, your ultrasound and MRI returned normal.  Please use Tylenol for mild discomfort and drink plenty of fluids at home.  If at any point you develop worsening pain, vaginal discharge or bleeding, fever, vomiting or diarrhea, please return to the ER for further evaluation.  Otherwise, please call your OB clinic and schedule close follow-up for next week.

## 2024-02-14 ENCOUNTER — LAB REQUISITION (OUTPATIENT)
Dept: LAB | Facility: CLINIC | Age: 34
End: 2024-02-14
Payer: COMMERCIAL

## 2024-02-14 DIAGNOSIS — Z3A.28 28 WEEKS GESTATION OF PREGNANCY: ICD-10-CM

## 2024-02-14 PROCEDURE — 86780 TREPONEMA PALLIDUM: CPT | Mod: ORL | Performed by: OBSTETRICS & GYNECOLOGY

## 2024-02-15 LAB — T PALLIDUM AB SER QL: NONREACTIVE

## 2024-04-11 ENCOUNTER — LAB REQUISITION (OUTPATIENT)
Dept: LAB | Facility: CLINIC | Age: 34
End: 2024-04-11
Payer: COMMERCIAL

## 2024-04-11 DIAGNOSIS — Z34.83 ENCOUNTER FOR SUPERVISION OF OTHER NORMAL PREGNANCY, THIRD TRIMESTER: ICD-10-CM

## 2024-04-11 PROCEDURE — 87653 STREP B DNA AMP PROBE: CPT | Mod: ORL | Performed by: NURSE PRACTITIONER

## 2024-04-12 LAB — GP B STREP DNA SPEC QL NAA+PROBE: NEGATIVE

## 2024-04-17 ENCOUNTER — HOSPITAL ENCOUNTER (OUTPATIENT)
Facility: HOSPITAL | Age: 34
Discharge: HOME | End: 2024-04-17
Attending: OBSTETRICS & GYNECOLOGY | Admitting: OBSTETRICS & GYNECOLOGY
Payer: COMMERCIAL

## 2024-04-17 VITALS
TEMPERATURE: 98.2 F | DIASTOLIC BLOOD PRESSURE: 88 MMHG | SYSTOLIC BLOOD PRESSURE: 132 MMHG | HEART RATE: 104 BPM | RESPIRATION RATE: 18 BRPM

## 2024-04-17 PROBLEM — Z36.89 ENCOUNTER FOR TRIAGE IN PREGNANT PATIENT: Status: ACTIVE | Noted: 2024-04-17

## 2024-04-17 PROCEDURE — 59025 FETAL NON-STRESS TEST: CPT | Performed by: OBSTETRICS & GYNECOLOGY

## 2024-04-17 PROCEDURE — G0463 HOSPITAL OUTPT CLINIC VISIT: HCPCS

## 2024-04-17 RX ORDER — PROCHLORPERAZINE 25 MG
25 SUPPOSITORY, RECTAL RECTAL EVERY 12 HOURS PRN
Status: DISCONTINUED | OUTPATIENT
Start: 2024-04-17 | End: 2024-04-17 | Stop reason: HOSPADM

## 2024-04-17 RX ORDER — METOCLOPRAMIDE 10 MG/1
10 TABLET ORAL EVERY 6 HOURS PRN
Status: DISCONTINUED | OUTPATIENT
Start: 2024-04-17 | End: 2024-04-17 | Stop reason: HOSPADM

## 2024-04-17 RX ORDER — ONDANSETRON 2 MG/ML
4 INJECTION INTRAMUSCULAR; INTRAVENOUS EVERY 6 HOURS PRN
Status: DISCONTINUED | OUTPATIENT
Start: 2024-04-17 | End: 2024-04-17 | Stop reason: HOSPADM

## 2024-04-17 RX ORDER — METOCLOPRAMIDE HYDROCHLORIDE 5 MG/ML
10 INJECTION INTRAMUSCULAR; INTRAVENOUS EVERY 6 HOURS PRN
Status: DISCONTINUED | OUTPATIENT
Start: 2024-04-17 | End: 2024-04-17 | Stop reason: HOSPADM

## 2024-04-17 RX ORDER — ONDANSETRON 4 MG/1
4 TABLET, ORALLY DISINTEGRATING ORAL EVERY 6 HOURS PRN
Status: DISCONTINUED | OUTPATIENT
Start: 2024-04-17 | End: 2024-04-17 | Stop reason: HOSPADM

## 2024-04-17 RX ORDER — LIDOCAINE 40 MG/G
CREAM TOPICAL
Status: DISCONTINUED | OUTPATIENT
Start: 2024-04-17 | End: 2024-04-17 | Stop reason: HOSPADM

## 2024-04-17 RX ORDER — PROCHLORPERAZINE MALEATE 10 MG
10 TABLET ORAL EVERY 6 HOURS PRN
Status: DISCONTINUED | OUTPATIENT
Start: 2024-04-17 | End: 2024-04-17 | Stop reason: HOSPADM

## 2024-04-17 ASSESSMENT — ACTIVITIES OF DAILY LIVING (ADL): ADLS_ACUITY_SCORE: 35

## 2024-04-17 NOTE — PROCEDURES
[facility] NST Procedure note    Date: 2024    Indication: Gestational diabetes on insulin, obesity, history of prior , history of PPROM, abdominal pain    Procedure: Fetal non-stress test    Results: Reactive (140, moderate variability, + accels, no decels)    Aby Helm MD  2024 2:10 PM

## 2024-04-17 NOTE — PROGRESS NOTES
Discussed going home v staying for more monitoring with pt, at this time she feels comfortable going home. Educated on signs and symptoms of labor and reasons to call or come in.

## 2024-04-17 NOTE — PROGRESS NOTES
Pt presented to [facility] c/o pain on the top of her abdomen that last for a few seconds at a time. Not sure if they are ctns. Denies bleeding/leaking of fluid. Baby active, hx of csection x1, desires a TOLAC. Pt is 37.2, admitted to room 32, monitors applied, vss, afebrile.

## 2024-04-17 NOTE — DISCHARGE INSTRUCTIONS
Learning About When to Call Your Doctor During Pregnancy (After 20 Weeks)  Overview  It's common to have concerns about what might be a problem when you're pregnant. Most pregnancies don't have any serious problems. But it's still important to know when to call your doctor if you have certain symptoms or signs of labor.  These are general suggestions. Your doctor may give you some more information about when to call.  When to call your doctor (after 20 weeks)  Call 911  anytime you think you may need emergency care. For example, call if:  You have severe vaginal bleeding. This means you are soaking through a pad each hour for 2 or more hours.  You have sudden, severe pain in your belly.  You have chest pain, are short of breath, or cough up blood.  You passed out (lost consciousness).  You have a seizure.  You see or feel the umbilical cord.  You think you are about to deliver your baby and can't make it safely to the hospital or birthing center.  Call your doctor now or seek immediate medical care if:  You have vaginal bleeding.  You have belly pain.  You have a fever.  You are dizzy or lightheaded, or you feel like you may faint.  You have signs of a blood clot in your leg (called a deep vein thrombosis), such as:  Pain in the calf, back of the knee, thigh, or groin.  Swelling in your leg or groin.  A color change on the leg or groin. The skin may be reddish or purplish, depending on your usual skin color.  You have symptoms of preeclampsia, such as:  Sudden swelling of your face, hands, or feet.  New vision problems (such as dimness, blurring, or seeing spots).  A severe headache.  You have a sudden release of fluid from your vagina. (You think your water broke.)  You've been having regular contractions for an hour. This means that you've had at least 6 contractions within 1 hour, even after you change your position and drink fluids.  You notice that your baby has stopped moving or is moving less than  "normal.  You have signs of heart failure, such as:  New or increased shortness of breath.  New or worse swelling in your legs, ankles, or feet.  Sudden weight gain, such as more than 2 to 3 pounds in a day or 5 pounds in a week.  Feeling so tired or weak that you cannot do your usual activities.  You have symptoms of a urinary tract infection. These may include:  Pain or burning when you urinate.  A frequent need to urinate without being able to pass much urine.  Pain in the flank, which is just below the rib cage and above the waist on either side of the back.  Blood in your urine.  Watch closely for changes in your health, and be sure to contact your doctor if:  You have vaginal discharge that smells bad.  You feel sad, anxious, or hopeless for more than a few days.  You have skin changes, such as a rash, itching, or a yellow color to your skin.  You have other concerns about your pregnancy.  If you have labor signs at 37 weeks or more  If you have signs of labor at 37 weeks or more, your doctor may tell you to call when your labor becomes more active. Symptoms of active labor include:  Contractions that are regular.  Contractions that are less than 5 minutes apart.  Contractions that are hard to talk through.  Follow-up care is a key part of your treatment and safety. Be sure to make and go to all appointments, and call your doctor if you are having problems. It's also a good idea to know your test results and keep a list of the medicines you take.  Where can you learn more?  Go to https://www.GitHub.net/patiented  Enter N531 in the search box to learn more about \"Learning About When to Call Your Doctor During Pregnancy (After 20 Weeks).\"  Current as of: July 10, 2023               Content Version: 14.0    1287-8570 [employer].   Care instructions adapted under license by your healthcare professional. If you have questions about a medical condition or this instruction, always ask your healthcare " professional. [employer] disclaims any warranty or liability for your use of this information.

## 2024-04-17 NOTE — PROGRESS NOTES
Updated Dr. Helm on cervical exam. She reviewed the strip. Ok to discharge if pt comfortable with plan.

## 2024-05-03 ENCOUNTER — HOSPITAL ENCOUNTER (INPATIENT)
Facility: HOSPITAL | Age: 34
LOS: 3 days | Discharge: HOME | End: 2024-05-06
Attending: OBSTETRICS & GYNECOLOGY | Admitting: OBSTETRICS & GYNECOLOGY
Payer: COMMERCIAL

## 2024-05-03 DIAGNOSIS — O34.219 VBAC (VAGINAL BIRTH AFTER CESAREAN): Primary | ICD-10-CM

## 2024-05-03 PROBLEM — Z34.90 PREGNANCY: Status: ACTIVE | Noted: 2024-05-03

## 2024-05-03 LAB
ABO/RH(D): NORMAL
ANTIBODY SCREEN: NEGATIVE
GLUCOSE BLDC GLUCOMTR-MCNC: 98 MG/DL (ref 70–99)
HGB BLD-MCNC: 13.7 G/DL (ref 11.7–15.7)
SPECIMEN EXPIRATION DATE: NORMAL

## 2024-05-03 PROCEDURE — 36415 COLL VENOUS BLD VENIPUNCTURE: CPT | Performed by: OBSTETRICS & GYNECOLOGY

## 2024-05-03 PROCEDURE — 82962 GLUCOSE BLOOD TEST: CPT

## 2024-05-03 PROCEDURE — 120N000001 HC R&B MED SURG/OB

## 2024-05-03 PROCEDURE — 85018 HEMOGLOBIN: CPT | Performed by: OBSTETRICS & GYNECOLOGY

## 2024-05-03 PROCEDURE — 86900 BLOOD TYPING SEROLOGIC ABO: CPT | Performed by: OBSTETRICS & GYNECOLOGY

## 2024-05-03 RX ORDER — NALOXONE HYDROCHLORIDE 0.4 MG/ML
0.2 INJECTION, SOLUTION INTRAMUSCULAR; INTRAVENOUS; SUBCUTANEOUS
Status: DISCONTINUED | OUTPATIENT
Start: 2024-05-03 | End: 2024-05-04 | Stop reason: HOSPADM

## 2024-05-03 RX ORDER — FENTANYL CITRATE 50 UG/ML
50 INJECTION, SOLUTION INTRAMUSCULAR; INTRAVENOUS EVERY 30 MIN PRN
Status: DISCONTINUED | OUTPATIENT
Start: 2024-05-03 | End: 2024-05-04 | Stop reason: HOSPADM

## 2024-05-03 RX ORDER — CITRIC ACID/SODIUM CITRATE 334-500MG
30 SOLUTION, ORAL ORAL
Status: DISCONTINUED | OUTPATIENT
Start: 2024-05-03 | End: 2024-05-04 | Stop reason: HOSPADM

## 2024-05-03 RX ORDER — MISOPROSTOL 200 UG/1
400 TABLET ORAL
Status: DISCONTINUED | OUTPATIENT
Start: 2024-05-03 | End: 2024-05-04 | Stop reason: HOSPADM

## 2024-05-03 RX ORDER — OXYTOCIN 10 [USP'U]/ML
10 INJECTION, SOLUTION INTRAMUSCULAR; INTRAVENOUS
Status: DISCONTINUED | OUTPATIENT
Start: 2024-05-03 | End: 2024-05-04 | Stop reason: HOSPADM

## 2024-05-03 RX ORDER — OXYTOCIN/0.9 % SODIUM CHLORIDE 30/500 ML
100-340 PLASTIC BAG, INJECTION (ML) INTRAVENOUS CONTINUOUS PRN
Status: DISCONTINUED | OUTPATIENT
Start: 2024-05-03 | End: 2024-05-06 | Stop reason: HOSPADM

## 2024-05-03 RX ORDER — OXYTOCIN 10 [USP'U]/ML
10 INJECTION, SOLUTION INTRAMUSCULAR; INTRAVENOUS
Status: DISCONTINUED | OUTPATIENT
Start: 2024-05-03 | End: 2024-05-06 | Stop reason: HOSPADM

## 2024-05-03 RX ORDER — PROCHLORPERAZINE 25 MG
25 SUPPOSITORY, RECTAL RECTAL EVERY 12 HOURS PRN
Status: DISCONTINUED | OUTPATIENT
Start: 2024-05-03 | End: 2024-05-04 | Stop reason: HOSPADM

## 2024-05-03 RX ORDER — LOPERAMIDE HCL 2 MG
2 CAPSULE ORAL
Status: DISCONTINUED | OUTPATIENT
Start: 2024-05-03 | End: 2024-05-04 | Stop reason: HOSPADM

## 2024-05-03 RX ORDER — NALOXONE HYDROCHLORIDE 0.4 MG/ML
0.4 INJECTION, SOLUTION INTRAMUSCULAR; INTRAVENOUS; SUBCUTANEOUS
Status: DISCONTINUED | OUTPATIENT
Start: 2024-05-03 | End: 2024-05-04 | Stop reason: HOSPADM

## 2024-05-03 RX ORDER — TRANEXAMIC ACID 10 MG/ML
1 INJECTION, SOLUTION INTRAVENOUS EVERY 30 MIN PRN
Status: DISCONTINUED | OUTPATIENT
Start: 2024-05-03 | End: 2024-05-04 | Stop reason: HOSPADM

## 2024-05-03 RX ORDER — INSULIN ASPART 100 [IU]/ML
6 INJECTION, SOLUTION INTRAVENOUS; SUBCUTANEOUS
Status: ON HOLD | COMMUNITY
End: 2024-05-06

## 2024-05-03 RX ORDER — OXYTOCIN/0.9 % SODIUM CHLORIDE 30/500 ML
340 PLASTIC BAG, INJECTION (ML) INTRAVENOUS CONTINUOUS PRN
Status: DISCONTINUED | OUTPATIENT
Start: 2024-05-03 | End: 2024-05-04 | Stop reason: HOSPADM

## 2024-05-03 RX ORDER — IBUPROFEN 800 MG/1
800 TABLET, FILM COATED ORAL
Status: DISCONTINUED | OUTPATIENT
Start: 2024-05-03 | End: 2024-05-06 | Stop reason: HOSPADM

## 2024-05-03 RX ORDER — METOCLOPRAMIDE 10 MG/1
10 TABLET ORAL EVERY 6 HOURS PRN
Status: DISCONTINUED | OUTPATIENT
Start: 2024-05-03 | End: 2024-05-04 | Stop reason: HOSPADM

## 2024-05-03 RX ORDER — KETOROLAC TROMETHAMINE 30 MG/ML
30 INJECTION, SOLUTION INTRAMUSCULAR; INTRAVENOUS
Status: DISCONTINUED | OUTPATIENT
Start: 2024-05-03 | End: 2024-05-06 | Stop reason: HOSPADM

## 2024-05-03 RX ORDER — LOPERAMIDE HCL 2 MG
4 CAPSULE ORAL
Status: DISCONTINUED | OUTPATIENT
Start: 2024-05-03 | End: 2024-05-04 | Stop reason: HOSPADM

## 2024-05-03 RX ORDER — PROCHLORPERAZINE MALEATE 10 MG
10 TABLET ORAL EVERY 6 HOURS PRN
Status: DISCONTINUED | OUTPATIENT
Start: 2024-05-03 | End: 2024-05-04 | Stop reason: HOSPADM

## 2024-05-03 RX ORDER — MISOPROSTOL 200 UG/1
800 TABLET ORAL
Status: DISCONTINUED | OUTPATIENT
Start: 2024-05-03 | End: 2024-05-04 | Stop reason: HOSPADM

## 2024-05-03 RX ORDER — METHYLERGONOVINE MALEATE 0.2 MG/ML
200 INJECTION INTRAVENOUS
Status: DISCONTINUED | OUTPATIENT
Start: 2024-05-03 | End: 2024-05-04 | Stop reason: HOSPADM

## 2024-05-03 RX ORDER — METOCLOPRAMIDE HYDROCHLORIDE 5 MG/ML
10 INJECTION INTRAMUSCULAR; INTRAVENOUS EVERY 6 HOURS PRN
Status: DISCONTINUED | OUTPATIENT
Start: 2024-05-03 | End: 2024-05-04 | Stop reason: HOSPADM

## 2024-05-03 RX ORDER — LIDOCAINE 40 MG/G
CREAM TOPICAL
Status: DISCONTINUED | OUTPATIENT
Start: 2024-05-03 | End: 2024-05-04 | Stop reason: HOSPADM

## 2024-05-03 RX ORDER — ONDANSETRON 2 MG/ML
4 INJECTION INTRAMUSCULAR; INTRAVENOUS EVERY 6 HOURS PRN
Status: DISCONTINUED | OUTPATIENT
Start: 2024-05-03 | End: 2024-05-04 | Stop reason: HOSPADM

## 2024-05-03 RX ORDER — ONDANSETRON 4 MG/1
4 TABLET, ORALLY DISINTEGRATING ORAL EVERY 6 HOURS PRN
Status: DISCONTINUED | OUTPATIENT
Start: 2024-05-03 | End: 2024-05-04 | Stop reason: HOSPADM

## 2024-05-03 RX ORDER — CARBOPROST TROMETHAMINE 250 UG/ML
250 INJECTION, SOLUTION INTRAMUSCULAR
Status: DISCONTINUED | OUTPATIENT
Start: 2024-05-03 | End: 2024-05-04 | Stop reason: HOSPADM

## 2024-05-03 ASSESSMENT — ACTIVITIES OF DAILY LIVING (ADL)
ADLS_ACUITY_SCORE: 35
ADLS_ACUITY_SCORE: 18

## 2024-05-04 ENCOUNTER — ANESTHESIA (OUTPATIENT)
Dept: OBGYN | Facility: HOSPITAL | Age: 34
End: 2024-05-04
Payer: COMMERCIAL

## 2024-05-04 ENCOUNTER — ANESTHESIA EVENT (OUTPATIENT)
Dept: OBGYN | Facility: HOSPITAL | Age: 34
End: 2024-05-04
Payer: COMMERCIAL

## 2024-05-04 PROBLEM — O24.419 GDM, CLASS A2: Status: ACTIVE | Noted: 2024-05-04

## 2024-05-04 PROBLEM — O34.219 VBAC (VAGINAL BIRTH AFTER CESAREAN): Status: ACTIVE | Noted: 2024-05-04

## 2024-05-04 LAB
GLUCOSE BLDC GLUCOMTR-MCNC: 109 MG/DL (ref 70–99)
GLUCOSE BLDC GLUCOMTR-MCNC: 121 MG/DL (ref 70–99)
GLUCOSE BLDC GLUCOMTR-MCNC: 133 MG/DL (ref 70–99)
GLUCOSE BLDC GLUCOMTR-MCNC: 137 MG/DL (ref 70–99)
GLUCOSE BLDC GLUCOMTR-MCNC: 147 MG/DL (ref 70–99)
GLUCOSE BLDC GLUCOMTR-MCNC: 164 MG/DL (ref 70–99)
HOLD SPECIMEN: NORMAL

## 2024-05-04 PROCEDURE — 120N000001 HC R&B MED SURG/OB

## 2024-05-04 PROCEDURE — 250N000013 HC RX MED GY IP 250 OP 250 PS 637: Performed by: STUDENT IN AN ORGANIZED HEALTH CARE EDUCATION/TRAINING PROGRAM

## 2024-05-04 PROCEDURE — 00HU33Z INSERTION OF INFUSION DEVICE INTO SPINAL CANAL, PERCUTANEOUS APPROACH: ICD-10-PCS | Performed by: ANESTHESIOLOGY

## 2024-05-04 PROCEDURE — 722N000001 HC LABOR CARE VAGINAL DELIVERY SINGLE

## 2024-05-04 PROCEDURE — 258N000001 HC RX 258: Performed by: OBSTETRICS & GYNECOLOGY

## 2024-05-04 PROCEDURE — 370N000003 HC ANESTHESIA WARD SERVICE: Performed by: ANESTHESIOLOGY

## 2024-05-04 PROCEDURE — 258N000003 HC RX IP 258 OP 636: Performed by: OBSTETRICS & GYNECOLOGY

## 2024-05-04 PROCEDURE — 0KQM0ZZ REPAIR PERINEUM MUSCLE, OPEN APPROACH: ICD-10-PCS | Performed by: FAMILY MEDICINE

## 2024-05-04 PROCEDURE — 250N000011 HC RX IP 250 OP 636: Performed by: ANESTHESIOLOGY

## 2024-05-04 PROCEDURE — 3E0R3BZ INTRODUCTION OF ANESTHETIC AGENT INTO SPINAL CANAL, PERCUTANEOUS APPROACH: ICD-10-PCS | Performed by: ANESTHESIOLOGY

## 2024-05-04 PROCEDURE — 250N000009 HC RX 250: Performed by: OBSTETRICS & GYNECOLOGY

## 2024-05-04 PROCEDURE — 250N000011 HC RX IP 250 OP 636: Performed by: OBSTETRICS & GYNECOLOGY

## 2024-05-04 RX ORDER — OXYTOCIN 10 [USP'U]/ML
10 INJECTION, SOLUTION INTRAMUSCULAR; INTRAVENOUS
Status: DISCONTINUED | OUTPATIENT
Start: 2024-05-04 | End: 2024-05-06 | Stop reason: HOSPADM

## 2024-05-04 RX ORDER — ACETAMINOPHEN 325 MG/1
650 TABLET ORAL EVERY 4 HOURS PRN
Status: DISCONTINUED | OUTPATIENT
Start: 2024-05-04 | End: 2024-05-06 | Stop reason: HOSPADM

## 2024-05-04 RX ORDER — MISOPROSTOL 200 UG/1
400 TABLET ORAL
Status: DISCONTINUED | OUTPATIENT
Start: 2024-05-04 | End: 2024-05-06 | Stop reason: HOSPADM

## 2024-05-04 RX ORDER — NALBUPHINE HYDROCHLORIDE 20 MG/ML
2.5-5 INJECTION, SOLUTION INTRAMUSCULAR; INTRAVENOUS; SUBCUTANEOUS EVERY 6 HOURS PRN
Status: DISCONTINUED | OUTPATIENT
Start: 2024-05-04 | End: 2024-05-06 | Stop reason: HOSPADM

## 2024-05-04 RX ORDER — SODIUM CHLORIDE 9 MG/ML
INJECTION, SOLUTION INTRAVENOUS CONTINUOUS PRN
Status: DISCONTINUED | OUTPATIENT
Start: 2024-05-04 | End: 2024-05-06 | Stop reason: HOSPADM

## 2024-05-04 RX ORDER — TRANEXAMIC ACID 10 MG/ML
1 INJECTION, SOLUTION INTRAVENOUS EVERY 30 MIN PRN
Status: DISCONTINUED | OUTPATIENT
Start: 2024-05-04 | End: 2024-05-06 | Stop reason: HOSPADM

## 2024-05-04 RX ORDER — DEXTROSE MONOHYDRATE 100 MG/ML
INJECTION, SOLUTION INTRAVENOUS CONTINUOUS PRN
Status: DISCONTINUED | OUTPATIENT
Start: 2024-05-04 | End: 2024-05-06 | Stop reason: HOSPADM

## 2024-05-04 RX ORDER — FENTANYL/ROPIVACAINE/NS/PF 2MCG/ML-.1
PLASTIC BAG, INJECTION (ML) EPIDURAL
Status: DISCONTINUED | OUTPATIENT
Start: 2024-05-04 | End: 2024-05-04 | Stop reason: HOSPADM

## 2024-05-04 RX ORDER — NICOTINE POLACRILEX 4 MG
15-30 LOZENGE BUCCAL
Status: DISCONTINUED | OUTPATIENT
Start: 2024-05-04 | End: 2024-05-06 | Stop reason: HOSPADM

## 2024-05-04 RX ORDER — FENTANYL CITRATE-0.9 % NACL/PF 10 MCG/ML
100 PLASTIC BAG, INJECTION (ML) INTRAVENOUS EVERY 5 MIN PRN
Status: DISCONTINUED | OUTPATIENT
Start: 2024-05-04 | End: 2024-05-04 | Stop reason: HOSPADM

## 2024-05-04 RX ORDER — DOCUSATE SODIUM 100 MG/1
100 CAPSULE, LIQUID FILLED ORAL DAILY
Status: DISCONTINUED | OUTPATIENT
Start: 2024-05-04 | End: 2024-05-06 | Stop reason: HOSPADM

## 2024-05-04 RX ORDER — LIDOCAINE HYDROCHLORIDE 10 MG/ML
INJECTION, SOLUTION EPIDURAL; INFILTRATION; INTRACAUDAL; PERINEURAL
Status: COMPLETED
Start: 2024-05-04 | End: 2024-05-04

## 2024-05-04 RX ORDER — BUPIVACAINE HYDROCHLORIDE 2.5 MG/ML
INJECTION, SOLUTION EPIDURAL; INFILTRATION; INTRACAUDAL
Status: COMPLETED | OUTPATIENT
Start: 2024-05-04 | End: 2024-05-04

## 2024-05-04 RX ORDER — IBUPROFEN 800 MG/1
800 TABLET, FILM COATED ORAL EVERY 6 HOURS PRN
Status: DISCONTINUED | OUTPATIENT
Start: 2024-05-04 | End: 2024-05-06 | Stop reason: HOSPADM

## 2024-05-04 RX ORDER — NICOTINE POLACRILEX 4 MG
15-30 LOZENGE BUCCAL
Status: DISCONTINUED | OUTPATIENT
Start: 2024-05-04 | End: 2024-05-05

## 2024-05-04 RX ORDER — HYDROCORTISONE 25 MG/G
CREAM TOPICAL 3 TIMES DAILY PRN
Status: DISCONTINUED | OUTPATIENT
Start: 2024-05-04 | End: 2024-05-06 | Stop reason: HOSPADM

## 2024-05-04 RX ORDER — OXYTOCIN/0.9 % SODIUM CHLORIDE 30/500 ML
340 PLASTIC BAG, INJECTION (ML) INTRAVENOUS CONTINUOUS PRN
Status: DISCONTINUED | OUTPATIENT
Start: 2024-05-04 | End: 2024-05-06 | Stop reason: HOSPADM

## 2024-05-04 RX ORDER — MISOPROSTOL 200 UG/1
800 TABLET ORAL
Status: DISCONTINUED | OUTPATIENT
Start: 2024-05-04 | End: 2024-05-06 | Stop reason: HOSPADM

## 2024-05-04 RX ORDER — METHYLERGONOVINE MALEATE 0.2 MG/ML
200 INJECTION INTRAVENOUS
Status: DISCONTINUED | OUTPATIENT
Start: 2024-05-04 | End: 2024-05-06 | Stop reason: HOSPADM

## 2024-05-04 RX ORDER — LOPERAMIDE HCL 2 MG
2 CAPSULE ORAL
Status: DISCONTINUED | OUTPATIENT
Start: 2024-05-04 | End: 2024-05-06 | Stop reason: HOSPADM

## 2024-05-04 RX ORDER — LOPERAMIDE HCL 2 MG
4 CAPSULE ORAL
Status: DISCONTINUED | OUTPATIENT
Start: 2024-05-04 | End: 2024-05-06 | Stop reason: HOSPADM

## 2024-05-04 RX ORDER — DEXTROSE MONOHYDRATE 25 G/50ML
25-50 INJECTION, SOLUTION INTRAVENOUS
Status: DISCONTINUED | OUTPATIENT
Start: 2024-05-04 | End: 2024-05-05

## 2024-05-04 RX ORDER — MODIFIED LANOLIN
OINTMENT (GRAM) TOPICAL
Status: DISCONTINUED | OUTPATIENT
Start: 2024-05-04 | End: 2024-05-06 | Stop reason: HOSPADM

## 2024-05-04 RX ORDER — DEXTROSE MONOHYDRATE 25 G/50ML
25-50 INJECTION, SOLUTION INTRAVENOUS
Status: DISCONTINUED | OUTPATIENT
Start: 2024-05-04 | End: 2024-05-06 | Stop reason: HOSPADM

## 2024-05-04 RX ORDER — CARBOPROST TROMETHAMINE 250 UG/ML
250 INJECTION, SOLUTION INTRAMUSCULAR
Status: DISCONTINUED | OUTPATIENT
Start: 2024-05-04 | End: 2024-05-06 | Stop reason: HOSPADM

## 2024-05-04 RX ORDER — BISACODYL 10 MG
10 SUPPOSITORY, RECTAL RECTAL DAILY PRN
Status: DISCONTINUED | OUTPATIENT
Start: 2024-05-04 | End: 2024-05-06 | Stop reason: HOSPADM

## 2024-05-04 RX ADMIN — ACETAMINOPHEN 650 MG: 325 TABLET ORAL at 15:47

## 2024-05-04 RX ADMIN — BUPIVACAINE HYDROCHLORIDE 5 ML: 2.5 INJECTION, SOLUTION EPIDURAL; INFILTRATION; INTRACAUDAL at 01:20

## 2024-05-04 RX ADMIN — Medication: at 01:32

## 2024-05-04 RX ADMIN — SODIUM CHLORIDE: 9 INJECTION, SOLUTION INTRAVENOUS at 03:30

## 2024-05-04 RX ADMIN — ACETAMINOPHEN 650 MG: 325 TABLET ORAL at 23:59

## 2024-05-04 RX ADMIN — ACETAMINOPHEN 650 MG: 325 TABLET ORAL at 19:55

## 2024-05-04 RX ADMIN — ONDANSETRON 4 MG: 2 INJECTION INTRAMUSCULAR; INTRAVENOUS at 03:03

## 2024-05-04 RX ADMIN — BENZOCAINE AND LEVOMENTHOL: 200; 5 SPRAY TOPICAL at 11:15

## 2024-05-04 RX ADMIN — INSULIN HUMAN: 1 INJECTION, SOLUTION INTRAVENOUS at 07:25

## 2024-05-04 RX ADMIN — INSULIN HUMAN 1 UNITS/HR: 1 INJECTION, SOLUTION INTRAVENOUS at 03:38

## 2024-05-04 RX ADMIN — Medication 340 ML/HR: at 08:06

## 2024-05-04 RX ADMIN — IBUPROFEN 800 MG: 800 TABLET ORAL at 23:59

## 2024-05-04 RX ADMIN — KETOROLAC TROMETHAMINE 30 MG: 30 INJECTION, SOLUTION INTRAMUSCULAR at 10:50

## 2024-05-04 RX ADMIN — ACETAMINOPHEN 650 MG: 325 TABLET ORAL at 10:49

## 2024-05-04 RX ADMIN — SODIUM CHLORIDE, POTASSIUM CHLORIDE, SODIUM LACTATE AND CALCIUM CHLORIDE 1000 ML: 600; 310; 30; 20 INJECTION, SOLUTION INTRAVENOUS at 00:24

## 2024-05-04 RX ADMIN — DEXTROSE MONOHYDRATE: 100 INJECTION, SOLUTION INTRAVENOUS at 03:45

## 2024-05-04 RX ADMIN — IBUPROFEN 800 MG: 800 TABLET ORAL at 17:21

## 2024-05-04 RX ADMIN — DOCUSATE SODIUM 100 MG: 100 CAPSULE, LIQUID FILLED ORAL at 10:25

## 2024-05-04 ASSESSMENT — ACTIVITIES OF DAILY LIVING (ADL)
ADLS_ACUITY_SCORE: 18
ADLS_ACUITY_SCORE: 18
ADLS_ACUITY_SCORE: 19
ADLS_ACUITY_SCORE: 18
ADLS_ACUITY_SCORE: 19
ADLS_ACUITY_SCORE: 19
ADLS_ACUITY_SCORE: 18
ADLS_ACUITY_SCORE: 18
ADLS_ACUITY_SCORE: 19
ADLS_ACUITY_SCORE: 18
ADLS_ACUITY_SCORE: 19
ADLS_ACUITY_SCORE: 18
ADLS_ACUITY_SCORE: 19
ADLS_ACUITY_SCORE: 19
ADLS_ACUITY_SCORE: 18
ADLS_ACUITY_SCORE: 19

## 2024-05-04 NOTE — H&P
OB HISTORY AND PHYSICAL UPDATE ADMISSION EXAM    Kim Flower  1990  [id number]    HPI: 32yo  @ 39 4/7wks here with labor pains and SROM.   Preg followed by Dr. Kerr at [facility].  Preg complicated by Obesity, prev c/s and anxiety.    3-4 cm in clinic today- membranes swept.  GDMA2- managed with NPH 14 units-   no insulin required with meals.        Estimated Date of Delivery: May 6, 2024                       EGA 39w4d    OB History    Para Term  AB Living   2 1 0 1 0 1   SAB IAB Ectopic Multiple Live Births   0 0 0 0 1      # Outcome Date GA Lbr Srikanth/2nd Weight Sex Type Anes PTL Lv   2 Current            1  07/10/21 35w0d   F CS-LTranv   [facility]      Complications: Ruptured membranes, prolonged      Name: KATHLEEN,FEMALE-KIM      Obstetric Comments   It's a GIRL!   FOB is [place]     Past Medical History:   Diagnosis Date    Diabetes (H)     Other malaise and fatigue     Created by Conversion     Transverse lie of fetus 2021     Past Surgical History:   Procedure Laterality Date     SECTION N/A 7/10/2021    Procedure:  SECTION;  Surgeon: Mira Kerr DO;  Location: [facility] OR;  Service: Obstetrics    WISDOM TOOTH EXTRACTION         Exam:    There were no vitals taken for this visit.        HEENT          WNL              Heart              WNL               Lungs             WNL                      Abdomen        WNL                       Extremities     WNL                     Vaginal exam Declines      Fetal Status  Cat 1    Assessment: Labor    Plan: Spontaneous labor, anticipate vaginal delivery- GBS neg.  Patient declines a cervical examination- we discussed with  and significant other in room that we cannot start insulin or try to get adequate sugar control at or near time of delivery unless we understand when she will deliver.  Since no examination is allowed we will do our best to manage this, however, she does understand the  result may be not optimal bgm on infant when it delivers which may require NICU stay, or problems with hyper/hypoglycemia.       Christelle Muñiz DO, FACOG  5/3/2024 11:20 PM

## 2024-05-04 NOTE — PROVIDER NOTIFICATION
Dr Jacob HDZ informed of blood glucose reading 164mg/dl; MD will put in protocol.    Order received to allow the pt to labor down and call when .

## 2024-05-04 NOTE — PLAN OF CARE
Hali is bonding well with . Pain management with IV and oral medication along with perineal spray and tux pads. Tub bath, bladder emptied without difficulty.     Problem: Postpartum (Vaginal Delivery)  Goal: Optimal Pain Control and Function  Intervention: Prevent or Manage Pain  Recent Flowsheet Documentation  Taken 2024 1100 by Gauri Rich, RN  Perineal Care:   cold pack/ice pack applied   perineal hygiene encouraged   perineal spray bottle/warm water use encouraged  Taken 2024 1050 by Gauri Rich, RN  Pain Management Interventions: medication (see MAR)  Taken 2024 0745 by Gauri Rich, RN  Pain Management Interventions: (epidural) other (see comments)

## 2024-05-04 NOTE — ANESTHESIA PREPROCEDURE EVALUATION
Anesthesia Pre-Procedure Evaluation    Patient: Hali Flower   MRN: [id number] : 1990        Procedure :           Past Medical History:   Diagnosis Date     Diabetes (H)      Other malaise and fatigue     Created by Conversion      Transverse lie of fetus 2021      Past Surgical History:   Procedure Laterality Date      SECTION N/A 7/10/2021    Procedure:  SECTION;  Surgeon: Mira Kerr DO;  Location: [facility] OR;  Service: Obstetrics     WISDOM TOOTH EXTRACTION        No Known Allergies   Social History     Tobacco Use     Smoking status: Never     Passive exposure: Yes     Smokeless tobacco: Never   Substance Use Topics     Alcohol use: Not Currently     Alcohol/week: 2.0 - 6.0 standard drinks of alcohol     Comment: occ      Wt Readings from Last 1 Encounters:   23 89 kg (196 lb 4.8 oz)        Anesthesia Evaluation            ROS/MED HX  ENT/Pulmonary:  - neg pulmonary ROS     Neurologic:  - neg neurologic ROS     Cardiovascular:  - neg cardiovascular ROS     METS/Exercise Tolerance: >4 METS    Hematologic:  - neg hematologic  ROS     Musculoskeletal:  - neg musculoskeletal ROS     GI/Hepatic:  - neg GI/hepatic ROS     Renal/Genitourinary:  - neg Renal ROS     Endo:     (+)  type II DM,             Obesity,       Psychiatric/Substance Use:  - neg psychiatric ROS     Infectious Disease:  - neg infectious disease ROS     Malignancy:  - neg malignancy ROS     Other:      (+) Possibly pregnant, , ,       Physical Exam    Airway  airway exam normal      Mallampati: II       Respiratory Devices and Support         Dental  no notable dental history         Cardiovascular   cardiovascular exam normal       Rhythm and rate: regular and normal     Pulmonary   pulmonary exam normal        breath sounds clear to auscultation       OUTSIDE LABS:  CBC:   Lab Results   Component Value Date    WBC 17.3 (H) 2023    WBC 13.9 (H) 2023    HGB 13.7 2024     "HGB 13.9 11/24/2023    HCT 40.1 11/24/2023    HCT 40.2 09/27/2023     11/24/2023     09/27/2023     BMP:   Lab Results   Component Value Date     11/24/2023     04/03/2023    POTASSIUM 4.1 11/24/2023    POTASSIUM 4.4 04/03/2023    CHLORIDE 101 11/24/2023    CHLORIDE 101 04/03/2023    CO2 23 11/24/2023    CO2 25 04/03/2023    BUN 8.0 11/24/2023    BUN 13.2 04/03/2023    CR 0.60 11/24/2023    CR 0.83 04/03/2023    GLC 98 05/03/2024    GLC 88 11/24/2023     COAGS: No results found for: \"PTT\", \"INR\", \"FIBR\"  POC:   Lab Results   Component Value Date    HCG Positive (A) 12/15/2020     HEPATIC:   Lab Results   Component Value Date    ALBUMIN 4.0 11/24/2023    PROTTOTAL 7.3 11/24/2023    ALT 14 11/24/2023    AST 14 11/24/2023    ALKPHOS 84 11/24/2023    BILITOTAL 0.3 11/24/2023     OTHER:   Lab Results   Component Value Date    LACT 0.9 11/24/2023    LEW 9.9 11/24/2023    LIPASE 36 11/24/2023    TSH 1.59 04/03/2023       Anesthesia Plan    ASA Status:  2       Anesthesia Type: Epidural.              Consents    Anesthesia Plan(s) and associated risks, benefits, and realistic alternatives discussed. Questions answered and patient/representative(s) expressed understanding.     - Discussed:     - Discussed with:  Patient      - Extended Intubation/Ventilatory Support Discussed: No.      - Patient is DNR/DNI Status: No     Use of blood products discussed: No .     Postoperative Care            Comments:             Kj Armendariz MD    I have reviewed the pertinent notes and labs in the chart from the past 30 days and (re)examined the patient.  Any updates or changes from those notes are reflected in this note.                  "

## 2024-05-04 NOTE — PLAN OF CARE
Problem: Labor  Goal: Stable Fetal Wellbeing  Outcome: Progressing     Problem: Labor  Goal: Acceptable Pain Control  Outcome: Progressing     Problem: Labor  Goal: Effective Progression to Delivery  Outcome: Progressing   Goal Outcome Evaluation:       Patient , general condition condition appears clinically stable. FHT moderate variability. Contractions irregular mild on palpation  soft on resting tone. Complete and pushing. Continue on insulin drip and 1hr glucose check. Remain afebrile.

## 2024-05-04 NOTE — PLAN OF CARE
Goal Outcome Evaluation:      Plan of Care Reviewed With: patient, spouse    Overall Patient Progress: improvingOverall Patient Progress: improving       Hali is progressing WNL through her postpartum course. Her bleeding is WNL, she is voiding spontaneously and her pain is well controlled. She is working on establishing breast feeding. This is her first time breast feeding a term baby. She was encouraged to call whenever she is getting ready to feed baby for ongoing assessment and education.

## 2024-05-04 NOTE — L&D DELIVERY NOTE
Delivery Summary    Hali Flower MRN# [id number]   Age: 33 year old YOB: 1990     ASSESSMENT & PLAN:   34 yo  at 39+5 w vaginal delivery successful  with epidural pain control.  Infant with loose nuchal cord X 2.  Spontaneous placenta delivery and 2nd degree tear with routine repair.    -routine postpartum care anticipated    GDMA2:  -insulin drip and dextrose during active labor  -per protocol insulin drip off and monitoring glucoses    Called to delivery as available physician.  Infant .     Ching, Female-Hali [[id number]]      Labor Event Times      Dilation complete date: 24 Complete time:  1:40 AM   Start pushing date/time: 2024 0408          Labor Events     labor?: No   steroids: None  Labor Type: TOLAC (Trial of Labor After )  Predominate monitoring during 1st stage: continuous electronic fetal monitoring     Antibiotics received during labor?: No       Rupture date/time: 5/3/24 1900   Fluid color: Clear  Fluid odor: Normal       Induction date/time:      Cervical ripening date/time:      Indications for induction: Diabetes (Comment to specify)     Augmentation: None       Delivery/Placenta Date and Time      Delivery Date: 24 Delivery Time:  8:01 AM   Placenta Date/Time: 2024  8:08 AM  Oxytocin given at the time of delivery: after delivery of baby  Delivering clinician: Josi Busby MD   Other personnel present at delivery:  Provider Role   Gauri Rich RN Registered Nurse   Candace Parkinson RN Registered Nurse             Vaginal Counts              Needles Suture Needles Sponges (RETIRED) Instruments   Initial counts   5    Added to count  1     Relief counts       Final counts               Placed during labor Accounted for at the end of labor   FSE NA NA   IUPC NA NA   Cervidil NA NA                             Cord      Cord Complications: Nuchal, Knot   Nuchal Intervention: delivered through          Nuchal cord description: loose nuchal cord                Labor Events and Shoulder Dystocia    Fetal Tracing Prior to Delivery: Category 1  Shoulder dystocia present?: Neg       Delivery (Maternal) (Provider to Complete) ([id number])    Episiotomy: None  Perineal lacerations: 2nd    Est. blood loss (mL): 100  Repair suture: 3-0 Vicryl  Number of repair packets: 1  Genital tract inspection done: Pos       Blood Loss  Mother: Hali Flower #[id number]     Start of Mother's Information      Delivery Blood Loss  05/03/24 2001 - 05/04/24 0921      EBL (mL) Hospital Encounter 100 mL    Total  100 mL               End of Mother's Information  Mother: Hali Flower #[id number]                Delivery - Provider to Complete ([id number])    Delivering clinician: Josi Busby MD  Delivery Type (Choose the 1 that will go to the Birth History): Vaginal, Spontaneous                         Other personnel:  Provider Role   Gauri Rich RN Registered Nurse   Candace Parkinson RN Registered Nurse                    Placenta    Date/Time: 5/4/2024  8:08 AM  Removal: Spontaneous  Disposition: Hospital disposal             Anesthesia    Method: Epidural, Nitrous Oxide                    Presentation and Position    Presentation: Vertex    Position: Middle Occiput Anterior                     Josi Busby MD

## 2024-05-04 NOTE — PROGRESS NOTES
10/100/-1 station  Cat 1  Ir[place] ct    Will labor down  Start GDMA2 order set in labor     Christelle Muñiz DO, FACOG  5/4/2024 2:16 AM

## 2024-05-04 NOTE — PROGRESS NOTES
Late entry    Data: Patient presented to [facility]: 5/3/2024  9:54 PM.  Reason for maternal/fetal assessment is uterine contractions. Patient reports SROM at 1900hrs . Patient denies vaginal bleeding. Patient reports fetal movement is normal. Patient is a 39w5d . Prenatal record reviewed. Pregnancy has been complicated by diabetes. Support person is present.     Fetal HR baseline was 135, variability is moderate (amplitude range 6 to 25 bpm). Accelerations: increase 15 bpm above baseline lasting 15 seconds. Decelerations: absent. Uterine assessment is mild by palpation during contractions and soft by palpation at rest. Cervical exam deferred.Patient refused vag exam Fetal presentation cephalic (confirmed by on unit ultrasound by Rosa Archer.) per Membranes: fluid visually apparent externally.    Vital signs wnl. Patient reports pain and is coping.     Action: Verbal consent for EFM. Triage assessment completed. Patient does not meet criteria for early labor discharge.     Response: Patient verbalized agreement with plan. Will contact DR James  with update and for further orders.

## 2024-05-04 NOTE — ANESTHESIA PROCEDURE NOTES
Epidural catheter Procedure Note    Pre-Procedure   Staff -        Anesthesiologist:  Kj Armendariz MD       Performed By: anesthesiologist       Location: OB       Procedure Start/Stop Times: 5/4/2024 1:20 AM and 5/4/2024 1:36 AM       Pre-Anesthestic Checklist: patient identified, IV checked, risks and benefits discussed, informed consent, monitors and equipment checked, pre-op evaluation, at physician/surgeon's request and post-op pain management  Timeout:       Correct Patient: Yes        Correct Procedure: Yes        Correct Site: Yes        Correct Position: Yes   Procedure Documentation  Procedure: epidural catheter       Diagnosis: labor pain       Patient Position: sitting       Patient Prep/Sterile Barriers: sterile gloves, mask, patient draped       Skin prep: Chloraprep       Local skin infiltrated with 2 mL of 1% lidocaine.        Insertion Site: L3-4. (midline approach).       Technique: LORT saline        Needle type: Ángel.       Needle Gauge: 18.        Needle Length (Inches): 3.5        Catheter: 20 G.          Catheter threaded easily.             # of attempts: 1 and  # of redirects:  0    Assessment/Narrative         Paresthesias: No.       Test dose of 3 mL lidocaine 1.5% w/ 1:200,000 epinephrine at.         Test dose negative, 3 minutes after injection, for signs of intravascular, subdural, or intrathecal injection.       Insertion/Infusion Method: LORT saline       Aspiration negative for Heme or CSF via Epidural Catheter.       Sensory Level Left: T10.       Sensory Level Right: T10.    Medication(s) Administered   0.25% Bupivacaine PF (Epidural) - EPIDURAL   5 mL - 5/4/2024 1:20:00 AM  Medication Administration Time: 5/4/2024 1:20 AM     Comments:  Called to room for labor epidural  Consent obtained. Risks of headache, back pain, nerve injury, and failure discussed. Alternatives discussed.  Patient sitting up  Epidural performed  Test dose negative  RN in room during entire  "procedure  Patient tolerated procedure, placed in left uterine position by RN, FHT stable post procedure          FOR [facility] ONLY:   Pain Team Contact information: please page the Pain Team Via [employer]. Search \"Pain\". During daytime hours, please page the attending first. At night please page the resident first.      "

## 2024-05-04 NOTE — PROVIDER NOTIFICATION
Dr James updated on blood glucose reading,147 mg/dl,started on insulin drip. 1 variable decel recorded, FHT moderate variability. Patient reported pressure, will start pushing.

## 2024-05-05 LAB
GLUCOSE BLDC GLUCOMTR-MCNC: 85 MG/DL (ref 70–99)
HGB BLD-MCNC: 11.6 G/DL (ref 11.7–15.7)

## 2024-05-05 PROCEDURE — 120N000001 HC R&B MED SURG/OB

## 2024-05-05 PROCEDURE — 36415 COLL VENOUS BLD VENIPUNCTURE: CPT | Performed by: STUDENT IN AN ORGANIZED HEALTH CARE EDUCATION/TRAINING PROGRAM

## 2024-05-05 PROCEDURE — 250N000013 HC RX MED GY IP 250 OP 250 PS 637: Performed by: STUDENT IN AN ORGANIZED HEALTH CARE EDUCATION/TRAINING PROGRAM

## 2024-05-05 PROCEDURE — 85018 HEMOGLOBIN: CPT | Performed by: STUDENT IN AN ORGANIZED HEALTH CARE EDUCATION/TRAINING PROGRAM

## 2024-05-05 RX ADMIN — IBUPROFEN 800 MG: 800 TABLET ORAL at 11:59

## 2024-05-05 RX ADMIN — IBUPROFEN 800 MG: 800 TABLET ORAL at 06:15

## 2024-05-05 RX ADMIN — ACETAMINOPHEN 650 MG: 325 TABLET ORAL at 11:59

## 2024-05-05 RX ADMIN — ACETAMINOPHEN 650 MG: 325 TABLET ORAL at 08:15

## 2024-05-05 RX ADMIN — DOCUSATE SODIUM 100 MG: 100 CAPSULE, LIQUID FILLED ORAL at 08:16

## 2024-05-05 RX ADMIN — ACETAMINOPHEN 650 MG: 325 TABLET ORAL at 21:18

## 2024-05-05 RX ADMIN — WITCH HAZEL: 500 SOLUTION RECTAL; TOPICAL at 17:21

## 2024-05-05 RX ADMIN — IBUPROFEN 800 MG: 800 TABLET ORAL at 18:14

## 2024-05-05 RX ADMIN — ACETAMINOPHEN 650 MG: 325 TABLET ORAL at 03:58

## 2024-05-05 RX ADMIN — ACETAMINOPHEN 650 MG: 325 TABLET ORAL at 17:17

## 2024-05-05 ASSESSMENT — ACTIVITIES OF DAILY LIVING (ADL)
ADLS_ACUITY_SCORE: 18

## 2024-05-05 NOTE — ANESTHESIA POSTPROCEDURE EVALUATION
Patient: Hali Flower    Procedure: * No procedures listed *       Anesthesia Type:  Epidural    Note:  Disposition: Inpatient   Postop Pain Control: Uneventful            Sign Out: Well controlled pain   PONV: No   Neuro/Psych: Uneventful            Sign Out: Acceptable/Baseline neuro status   Airway/Respiratory: Uneventful            Sign Out: Acceptable/Baseline resp. status   CV/Hemodynamics: Uneventful            Sign Out: Acceptable CV status; No obvious hypovolemia; No obvious fluid overload   Other NRE:    DID A NON-ROUTINE EVENT OCCUR?     Event details/Postop Comments:  No headache. No back pain. The effects of the epidural have worn off. No need for follow up.       Last vitals:  Vitals:    05/04/24 2359 05/05/24 0334 05/05/24 0815   BP: 132/74 110/71 110/74   Pulse: 98 98 96   Resp: 16 18 16   Temp:  36.6  C (97.8  F) 36.4  C (97.5  F)   SpO2: 98% 95% 98%       Electronically Signed By: Kj Armendariz MD  May 5, 2024  12:08 PM

## 2024-05-05 NOTE — LACTATION NOTE
Reason for Initial Lactation Visit: Lactation consultant to patient room to assess breastfeeding.     Breastfeeding goals: Breastfeeding     Past breastfeeding experience: exclusively pumped for first baby who was premature.    Maternal health risks that may affect breastfeeding: no    Pump for home use: Opera    Breastfeeding since birth?: yes, but has also been pumping.    Breast assessment: Large, pendulous, soft breasts, nipples soft and appear short.    Assessment/Intervention: Mother pumping when I entered the room. When asked why she stated that she didn't want to wake a sleeping baby, even thought it has been >3 hours since last breastfeeding. Pumped 20 ml off of L breast and 5 off R breast.    Instructed on benefit of skin to skin prior to feeding to waken and ready for feeding, importance of feeding baby on early hunger cues, and breastfeeding 8-12 times, both breasts, in 24 hours for adequate infant nutrition and hydration as well as for building an optimal milk supply.     Education given on importance of optimal infant positioning for deep, comfortable latch and effective milk transfer. Infant unswaddled and place vertically on mom's chest STS. Infant immediately rooting.     Assisted with both football and cross cradle hold on L breast as infant struggling latch deeply enough to sustain latch. Assisted with shaping nipple to achieve better latch. Infant sustained latch in cross cradle hold vs. football hold, but inconsistent sucking. Instructed on techniques for keeping infant actively sucking, including breast compressions. Anticipatory guidance given on input/output goals, normal feeding volumes in the  period, and pumping.     Feeding Plan: Breastfeeding at the breast as instructed above.     Pumping Plan: Instructed to pump only if poor feeding and/or only after breastfeeding, not before.

## 2024-05-05 NOTE — PLAN OF CARE
Problem: Postpartum (Vaginal Delivery)  Goal: Successful Parent Role Transition  Outcome: Progressing  Goal: Optimal Pain Control and Function  Outcome: Progressing  Goal: Effective Urinary Elimination  Outcome: Progressing   Goal Outcome Evaluation:           Patient ambulating independently and able to void easily.   Rates pain as 4-5 with ambulation. Taking tylenol, ibuprofen, witch hazel and spray.   Bonding well with baby.

## 2024-05-05 NOTE — PLAN OF CARE
Hali's VSS. Pain well controlled with PRN ibuprofen and tylenol. Fundal assessment and bleeding WNL. Tucks, dermoplast, and ice being utilized. Up and independent. Her and  are attentive to infant. Still needs morning blood sugar.   Problem: Adult Inpatient Plan of Care  Goal: Plan of Care Review  Description: The Plan of Care Review/Shift note should be completed every shift.  The Outcome Evaluation is a brief statement about your assessment that the patient is improving, declining, or no change.  This information will be displayed automatically on your shift  note.  Outcome: Progressing  Goal: Optimal Comfort and Wellbeing  Outcome: Progressing  Intervention: Monitor Pain and Promote Comfort  Recent Flowsheet Documentation  Taken 5/4/2024 1955 by Hermelinda Blackburn RN  Pain Management Interventions:   medication (see MAR)   pain management plan reviewed with patient/caregiver   rest  Intervention: Provide Person-Centered Care  Recent Flowsheet Documentation  Taken 5/4/2024 2359 by Hermelinda Blackburn RN  Trust Relationship/Rapport:   care explained   choices provided   questions answered   questions encouraged   thoughts/feelings acknowledged  Taken 5/4/2024 1955 by Hermelinda Blackburn RN  Trust Relationship/Rapport:   care explained   choices provided   questions answered   questions encouraged   thoughts/feelings acknowledged  Goal: Readiness for Transition of Care  Outcome: Progressing     Problem: Postpartum (Vaginal Delivery)  Goal: Successful Parent Role Transition  Outcome: Progressing  Intervention: Support Parent Role Transition  Recent Flowsheet Documentation  Taken 5/4/2024 2359 by Hermelinda Blackburn RN  Supportive Measures:   active listening utilized   decision-making supported   self-care encouraged   verbalization of feelings encouraged  Parent-Child Attachment Promotion:   caring behavior modeled   cue recognition promoted   skin-to-skin contact encouraged   strengths emphasized  Taken 5/4/2024 1955 by  Hermelinda Blackburn, RN  Supportive Measures:   active listening utilized   decision-making supported   self-care encouraged   verbalization of feelings encouraged  Parent-Child Attachment Promotion:   caring behavior modeled   cue recognition promoted   skin-to-skin contact encouraged   strengths emphasized  Goal: Hemostasis  Outcome: Progressing  Goal: Absence of Infection Signs and Symptoms  Outcome: Progressing  Goal: Optimal Pain Control and Function  Outcome: Progressing  Intervention: Prevent or Manage Pain  Recent Flowsheet Documentation  Taken 5/4/2024 2359 by Hermelinda Blackburn, RN  Perineal Care:   absorbent brief/pad changed   medicated pads applied   perineal hygiene encouraged   perineal spray bottle/warm water use encouraged  Taken 5/4/2024 1955 by Hermelinda Blackburn, RN  Pain Management Interventions:   medication (see MAR)   pain management plan reviewed with patient/caregiver   rest  Perineal Care:   absorbent brief/pad changed   medicated pads applied   perineal hygiene encouraged   perineal spray bottle/warm water use encouraged  Goal: Effective Urinary Elimination  Outcome: Progressing  Intervention: Monitor and Manage Urinary Retention  Recent Flowsheet Documentation  Taken 5/4/2024 2359 by Hermelinda Blackburn, RN  Urinary Elimination Promotion: frequent voiding encouraged  Taken 5/4/2024 1955 by Hermelinda Blackburn, RN  Urinary Elimination Promotion: frequent voiding encouraged

## 2024-05-06 VITALS
SYSTOLIC BLOOD PRESSURE: 116 MMHG | DIASTOLIC BLOOD PRESSURE: 67 MMHG | OXYGEN SATURATION: 96 % | BODY MASS INDEX: 37.02 KG/M2 | HEART RATE: 118 BPM | RESPIRATION RATE: 16 BRPM | WEIGHT: 201.2 LBS | TEMPERATURE: 98.3 F | HEIGHT: 62 IN

## 2024-05-06 PROCEDURE — 250N000013 HC RX MED GY IP 250 OP 250 PS 637: Performed by: STUDENT IN AN ORGANIZED HEALTH CARE EDUCATION/TRAINING PROGRAM

## 2024-05-06 RX ORDER — IBUPROFEN 800 MG/1
800 TABLET, FILM COATED ORAL EVERY 6 HOURS PRN
COMMUNITY
Start: 2024-05-06

## 2024-05-06 RX ORDER — DOCUSATE SODIUM 100 MG/1
100 CAPSULE, LIQUID FILLED ORAL DAILY
COMMUNITY
Start: 2024-05-06

## 2024-05-06 RX ORDER — ACETAMINOPHEN 325 MG/1
650 TABLET ORAL EVERY 4 HOURS PRN
COMMUNITY
Start: 2024-05-06

## 2024-05-06 RX ADMIN — ACETAMINOPHEN 650 MG: 325 TABLET ORAL at 09:49

## 2024-05-06 RX ADMIN — IBUPROFEN 800 MG: 800 TABLET ORAL at 12:00

## 2024-05-06 RX ADMIN — ACETAMINOPHEN 650 MG: 325 TABLET ORAL at 05:49

## 2024-05-06 RX ADMIN — IBUPROFEN 800 MG: 800 TABLET ORAL at 05:48

## 2024-05-06 RX ADMIN — DOCUSATE SODIUM 100 MG: 100 CAPSULE, LIQUID FILLED ORAL at 09:49

## 2024-05-06 ASSESSMENT — ACTIVITIES OF DAILY LIVING (ADL)
ADLS_ACUITY_SCORE: 18

## 2024-05-06 NOTE — LACTATION NOTE
This note was copied from a baby's chart.  Lactation follow-up Note:      Hours since Delivery: 2 day 2 hours old.    Gestational Age at Delivery: 39.5 weeks.    Visit with Lactation: In the last 24 hours, infant has been to breast 8 times, has received 5-20mL of MBM via bottle, has voided x3, soiled x1, and had a weight loss of 5.15% (since delivery). Mother states infant is obtaining a deep, comfortable latch during feeding, and she was told by bedside RN to start supplementing last evening d/t infant not stooling for 6 hrs. Education given on anticipated diaper output, and supplementation, as infant doing well and mother doesn't need to pump at this time; family verbalized understanding. Infant breastfeeding on right breast in cross-cradle hold during visit, mother states latch is comfortable and infant is in rhythmic sucking pattern with multiple swallows heard. Encouraged mother to let primary RN know if she would like lactation to return for feeding assistance or if questions arise. Mother aware of lactation resources available to her after discharging from hospital.     Plan: Skin-to-skin prior to feedings. Continue breastfeeding on-demand and/or every 2-3 hours. Track feedings and diaper output.    Has Breast Pump for Home: Yes, Opera.    Education given: Stages of milk production after delivery,  behaviors during first few days of life, Hunger cues, How to obtain & maintain a deep, comfortable latch, Listening & watching for swallows, How do I know if my baby is finished & getting enough, Paced bottle feeding, Importance of tracking all feedings and diaper output, Engorgement, Reverse pressure softening, Nutritive vs non-nutritive sucking, Pumping for missed feedings at breast, Cluster feeding, How to tell if breastfeeding is going well, Supplementation volumes during first few days of life, [place] waking techniques, How to adjust a shallow latch, and Techniques for keeping infant actively sucking,  including breast compressions.

## 2024-05-06 NOTE — PLAN OF CARE
Pt's vsss and assessments stable.   Bleeding minimal, fundus firm and midline.   Pt report minimal discomfort, declined any pain medications.  Voiding and ambulating without difficult. Pumping with each feeding.

## 2024-05-06 NOTE — PLAN OF CARE
Goal Outcome Evaluation: Progressing     Hali's VSS.  She's independent with self and baby cares.  Ambulating and voiding without difficulty.  FFU/1, rubra lochia light-moderate, no clots.  She's been taking Tylenol and Ibuprofen for perineal pain; also side-lying in bed to take pressure of perineum.  She declined to soak in a warm tub for healing and pain control this shift.  Hali has been breastfeeding as baby cues, ~every 2-3.5 hours.  Baby latches and swallows can be heard, but baby gets sleepy after 10-15 minutes.  At 2200, Hali agreed to supplement with her colostrum (in nursery fridge) after each breastfeed during the night.  This was encouraged by this writer because baby had not voided or stooled since 1400 today, and had a 6% weight loss at 24 hour testing.    Hali's  is in the room.  He is helpful when asked.     Problem: Postpartum (Vaginal Delivery)  Goal: Optimal Pain Control and Function  Outcome: Progressing  Intervention: Prevent or Manage Pain  Recent Flowsheet Documentation  Taken 5/5/2024 2100 by Trista Lozano, RN  Perineal Care: (tucks and spray used)   absorbent brief/pad changed   perineal spray bottle/warm water use encouraged  Taken 5/5/2024 1814 by Trista Lozano, RN  Pain Management Interventions: medication (see MAR)  Taken 5/5/2024 1717 by Trista Lozano, RN  Pain Management Interventions: medication (see MAR)  Perineal Care:   absorbent brief/pad changed   perineal spray bottle/warm water use encouraged   medicated pads applied     Problem: Postpartum (Vaginal Delivery)  Goal: Hemostasis  Outcome: Progressing

## 2024-05-06 NOTE — DISCHARGE SUMMARY
"HOSPITAL DISCHARGE SUMMARY -   NAME: Hali Flower   : 1990    MRN: [id number]    PCP: No Ref-Primary, Physician    ADMISSION DATE:  5/3/2024  DELIVERY DATE:   2024  GESTATIONAL AGE:  39w5d     DISCHARGE DATE:  2024    REASON FOR ADMISSION: Delivery of baby     DIAGNOSIS:    1.   2. Apgars of 9   at 1 minute, 9  at 5 minutes  3. Weight (oz):  114.99  oz.    CONDITIONS COMPLICATION ANTEPARTUM/POSTPARTUM:  Refer to prenatal   Obesity  Insulin requiring gestational diabetes  Anxiety  History of  Section    PROCEDURES:     HISTORY OF PRESENT ILLNESS AND HOSPITAL COURSE: Hali Flower  is a 33 year old,  female who underwent successful .  Postpartum course was unremarkable.  Glucose values normalized.  Mood appropriate. Patient is now asymptomatic and vital signs are stable. She denies chest pain, shortness of breath or dizziness. Patient denies headache, change in vision or upper abdominal pain.  On the day of discharge patient was tolerating diet, pain was controlled with oral medications, she was voiding and passing gas.    EXAM:  Blood pressure 116/67, pulse 118, temperature 98.3  F (36.8  C), temperature source Oral, resp. rate 16, height 1.575 m (5' 2\"), weight 95 kg (209 lb 8 oz), SpO2 96%, unknown if currently breastfeeding.  General: NAD  Abdomen: Soft, non-tender  Uterine fundus: Firm, non-tender  Extremities: No pain, traced edema    DISPOSITION:  Home    DISCHARGE CONDITION: Good/Stable    DISCHARGE MEDICATIONS:      Review of your medicines        START taking        Dose / Directions   acetaminophen 325 MG tablet  Commonly known as: TYLENOL      Dose: 650 mg  Take 2 tablets (650 mg) by mouth every 4 hours as needed for mild pain or fever (greater than or equal to 38 C /100.4 F (oral) or 38.5 C/ 101.4 F (core).)  Refills: 0     docusate sodium 100 MG capsule  Commonly known as: COLACE      Dose: 100 mg  Take 1 capsule (100 mg) by mouth daily  Refills: 0   "   ibuprofen 800 MG tablet  Commonly known as: ADVIL/MOTRIN      Dose: 800 mg  Take 1 tablet (800 mg) by mouth every 6 hours as needed for other (cramping)  Refills: 0            CONTINUE these medicines which have NOT CHANGED        Dose / Directions   prenatal multivitamin  plus iron 27-1 MG Tabs      Dose: 1 tablet  Take 1 tablet by mouth daily  Refills: 0            STOP taking      insulin degludec 100 UNIT/ML pen  Commonly known as: TRESIBA        NovoLOG FLEXPEN 100 UNIT/ML soln  Generic drug: insulin aspart                  Where to get your medicines        Some of these will need a paper prescription and others can be bought over the counter. Ask your nurse if you have questions.    You don't need a prescription for these medications  acetaminophen 325 MG tablet  docusate sodium 100 MG capsule  ibuprofen 800 MG tablet           DISCHARGE PLAN:   - Follow up with  MD, in 1-2 weeks to check mood and postpartum concerns, 6 weeks for 2 hour glucose tolerance test.  - Take medication as prescribed  - Physical activity: As tolerated, no heavy lifting. Pelvic rest.  - Diet:  Regular  - Medication:  Please see MAR  - Warning signs discussed with patient about when to call the clinic/hospital  - All questions and concerns were answered for the patient prior to discharge.       Shea Patel MD FACOG  [facility]  [phone]      I saw the patient on the date of discharge  Total time spent for discharge on date of discharge: 20 minutes

## 2024-05-06 NOTE — PLAN OF CARE
Patient is managing pain with Ibuprofen and Tylenol. Postpartum assessment WNL. Patient is hopeful for discharge to home today. Mood assessment and birth certificate completed.   Problem: Postpartum (Vaginal Delivery)  Goal: Successful Parent Role Transition  Outcome: Progressing  Intervention: Support Parent Role Transition  Recent Flowsheet Documentation  Taken 5/6/2024 0945 by Mellisa Jasso RN  Supportive Measures:   active listening utilized   decision-making supported   positive reinforcement provided   self-care encouraged  Parent-Child Attachment Promotion:   caring behavior modeled   cue recognition promoted   face-to-face positioning promoted   interaction encouraged   participation in care promoted   positive reinforcement provided   skin-to-skin contact encouraged

## 2024-05-06 NOTE — DISCHARGE INSTRUCTIONS
Warning Signs after Having a Baby    Keep this paper on your fridge or somewhere else where you can see it.    Call your provider if you have any of these symptoms up to 12 weeks after having your baby.    Thoughts of hurting yourself or your baby  Pain in your chest or trouble breathing  Severe headache not helped by pain medicine  Eyesight concerns (blurry vision, seeing spots or flashes of light, other changes to eyesight)  Fainting, shaking or other signs of a seizure    Call 9-1-1 if you feel that it is an emergency.     The symptoms below can happen to anyone after giving birth. They can be very serious. Call your provider if you have any of these warning signs.    My provider s phone number: _______________________    Losing too much blood (hemorrhage)    Call your provider if you soak through a pad in less than an hour or pass blood clots bigger than a golf ball. These may be signs that you are bleeding too much.    Blood clots in the legs or lungs    After you give birth, your body naturally clots its blood to help prevent blood loss. Sometimes this increased clotting can happen in other areas of the body, like the legs or lungs. This can block your blood flow and be very dangerous.     Call your provider if you:  Have a red, swollen spot on the back of your leg that is warm or painful when you touch it.   Are coughing up blood.     Infection    Call your provider if you have any of these symptoms:  Fever of 100.4 F (38 C) or higher.  Pain or redness around your stitches if you had an incision.   Any yellow, white, or green fluid coming from places where you had stitches or surgery.    Mood Problems (postpartum depression)    Many people feel sad or have mood changes after having a baby. But for some people, these mood swings are worse.     Call your provider right away if you feel so anxious or nervous that you can't care for yourself or your baby.    Preeclampsia (high blood pressure)    Even if you  didn't have high blood pressure when you were pregnant, you are at risk for the high blood pressure disease called preeclampsia. This risk can last up to 12 weeks after giving birth.     Call your provider if you have:   Pain on your right side under your rib cage  Sudden swelling in the hands and face    Remember: You know your body. If something doesn't feel right, get medical help.     For informational purposes only. Not to replace the advice of your health care provider. Copyright 2020 [facility]. All rights reserved. Clinically reviewed by Chhaya Mccall, RNC-OB, MSN. Yorxs [id number] - Rev 02/23.

## 2024-05-06 NOTE — PLAN OF CARE
Patient's education and AVS information discussed. Patient reports understanding and agrees with the plan of care. Patient will follow-up with their clinic with any further questions or concerns. Patient walked with spouse and baby to entrance of hospital. Patient discharged at 12:15.

## 2024-06-18 ENCOUNTER — LAB REQUISITION (OUTPATIENT)
Dept: LAB | Facility: CLINIC | Age: 34
End: 2024-06-18
Payer: COMMERCIAL

## 2024-06-18 DIAGNOSIS — R61 GENERALIZED HYPERHIDROSIS: ICD-10-CM

## 2024-06-18 DIAGNOSIS — Z3A.39 39 WEEKS GESTATION OF PREGNANCY: ICD-10-CM

## 2024-06-18 DIAGNOSIS — O24.414 GESTATIONAL DIABETES MELLITUS IN PREGNANCY, INSULIN CONTROLLED: ICD-10-CM

## 2024-06-18 LAB
ALBUMIN SERPL BCG-MCNC: 4.3 G/DL (ref 3.5–5.2)
ALP SERPL-CCNC: 97 U/L (ref 40–150)
ALT SERPL W P-5'-P-CCNC: 15 U/L (ref 0–50)
ANION GAP SERPL CALCULATED.3IONS-SCNC: 11 MMOL/L (ref 7–15)
AST SERPL W P-5'-P-CCNC: 17 U/L (ref 0–45)
BILIRUB SERPL-MCNC: 0.3 MG/DL
BUN SERPL-MCNC: 20.6 MG/DL (ref 6–20)
CALCIUM SERPL-MCNC: 9.9 MG/DL (ref 8.6–10)
CHLORIDE SERPL-SCNC: 104 MMOL/L (ref 98–107)
CREAT SERPL-MCNC: 0.85 MG/DL (ref 0.51–0.95)
DEPRECATED HCO3 PLAS-SCNC: 24 MMOL/L (ref 22–29)
EGFRCR SERPLBLD CKD-EPI 2021: >90 ML/MIN/1.73M2
ERYTHROCYTE [DISTWIDTH] IN BLOOD BY AUTOMATED COUNT: 11.9 % (ref 10–15)
GLUCOSE SERPL-MCNC: 86 MG/DL (ref 70–99)
HBA1C MFR BLD: 5.7 %
HCT VFR BLD AUTO: 42.6 % (ref 35–47)
HGB BLD-MCNC: 14.4 G/DL (ref 11.7–15.7)
MCH RBC QN AUTO: 29.9 PG (ref 26.5–33)
MCHC RBC AUTO-ENTMCNC: 33.8 G/DL (ref 31.5–36.5)
MCV RBC AUTO: 88 FL (ref 78–100)
PLATELET # BLD AUTO: 180 10E3/UL (ref 150–450)
POTASSIUM SERPL-SCNC: 4.4 MMOL/L (ref 3.4–5.3)
PROT SERPL-MCNC: 7.1 G/DL (ref 6.4–8.3)
RBC # BLD AUTO: 4.82 10E6/UL (ref 3.8–5.2)
SODIUM SERPL-SCNC: 139 MMOL/L (ref 135–145)
TSH SERPL DL<=0.005 MIU/L-ACNC: 1.46 UIU/ML (ref 0.3–4.2)
WBC # BLD AUTO: 10.5 10E3/UL (ref 4–11)

## 2024-06-18 PROCEDURE — 84443 ASSAY THYROID STIM HORMONE: CPT | Mod: ORL | Performed by: OBSTETRICS & GYNECOLOGY

## 2024-06-18 PROCEDURE — 85027 COMPLETE CBC AUTOMATED: CPT | Mod: ORL | Performed by: OBSTETRICS & GYNECOLOGY

## 2024-06-18 PROCEDURE — 83036 HEMOGLOBIN GLYCOSYLATED A1C: CPT | Mod: ORL | Performed by: OBSTETRICS & GYNECOLOGY

## 2024-06-18 PROCEDURE — 80053 COMPREHEN METABOLIC PANEL: CPT | Mod: ORL | Performed by: OBSTETRICS & GYNECOLOGY

## 2024-06-23 ENCOUNTER — HEALTH MAINTENANCE LETTER (OUTPATIENT)
Age: 34
End: 2024-06-23

## 2024-06-28 ENCOUNTER — MEDICAL CORRESPONDENCE (OUTPATIENT)
Dept: HEALTH INFORMATION MANAGEMENT | Facility: CLINIC | Age: 34
End: 2024-06-28
Payer: COMMERCIAL

## 2024-08-23 ENCOUNTER — TELEPHONE (OUTPATIENT)
Dept: URGENT CARE | Facility: URGENT CARE | Age: 34
End: 2024-08-23
Payer: COMMERCIAL

## 2024-08-23 NOTE — TELEPHONE ENCOUNTER
Patient calling in and reports started symptoms yesterday and tested positive for covid today. Patient is not an established [facility] patient and had questions regarding children.    Instructions for Patients    What should I do if I have signs of COVID?   Take a home COVID-19 test  If you do not have a home test, request an eVisit (virtual visit) with a member of your health care team. You can do this two ways:  In [facility]  By calling [phone] ([phone])  Your care team will tell you how to schedule a test in a clinic     Getting your test results:   We'll contact you with your results within 2 to 3 days of testing. Results may be ready about a day earlier in [facility].      What are the symptoms of COVID-19?  Symptoms can include fever, cough, shortness of breath, chills, headache, muscle pain sore throat, fatigue, runny or stuffy nose, and loss of taste and smell. Other less common symptoms include nausea, vomiting, or diarrhea (watery stools).    Know when to call 911. Emergency warning signs include:  Trouble breathing or shortness of breath  Pain or pressure in the chest that doesn't go away  Feeling confused like you haven't felt before, or not being able to wake up  Bluish-colored lips or face    How can I take care of myself?  Get lots of rest. Drink extra fluids (unless a doctor has told you not to).  Take Tylenol (acetaminophen) for fever or pain. If you have liver or kidney problems, ask your family doctor if it's okay to take Tylenol   Adults can take either:   650 mg (two 325 mg pills) every 4 to 6 hours, or...   1,000 mg (two 500 mg pills) every 8 hours as needed.  Note: Don't take more than 3,000 mg in one day. Acetaminophen is found in many medicines (both prescribed and over the counter medicines). Read all labels to be sure you don't take too much.  For children, check the Tylenol bottle for the right dose. The dose is based on the child's age or weight.  Take over the counter  medicines for your symptoms as needed. Talk to your pharmacist.  If you have other health problems (like cancer, heart failure, an organ transplant, or severe kidney disease): Call your specialty clinic if you don't feel better in the next 2 days.    Where can I get more information?   [employer] [place] COVID-19 Resource Hub: www.[email or web address]/covid19/   CDC Quarantine & Isolation: https://www.cdc.gov/coronavirus/2019-ncov/your-health/quarantine-isolation.html   CDC - What to Do If You're Sick: https://www.cdc.gov/coronavirus/2019-ncov/if-you-are-sick/index.html  Learn about the ACTIV-6 Clinical Trial: [email or web address] or call ([phone])-[phone]     Cynthia Kemp RN

## 2024-09-25 ENCOUNTER — LAB REQUISITION (OUTPATIENT)
Dept: LAB | Facility: CLINIC | Age: 34
End: 2024-09-25
Payer: COMMERCIAL

## 2024-09-25 DIAGNOSIS — Z3A.39 39 WEEKS GESTATION OF PREGNANCY: ICD-10-CM

## 2024-09-25 DIAGNOSIS — O24.414 GESTATIONAL DIABETES MELLITUS IN PREGNANCY, INSULIN CONTROLLED: ICD-10-CM

## 2024-09-25 LAB
EST. AVERAGE GLUCOSE BLD GHB EST-MCNC: 108 MG/DL
HBA1C MFR BLD: 5.4 %

## 2024-09-25 PROCEDURE — 83036 HEMOGLOBIN GLYCOSYLATED A1C: CPT | Mod: ORL | Performed by: OBSTETRICS & GYNECOLOGY

## 2024-10-28 ENCOUNTER — MEDICAL CORRESPONDENCE (OUTPATIENT)
Dept: HEALTH INFORMATION MANAGEMENT | Facility: CLINIC | Age: 34
End: 2024-10-28
Payer: COMMERCIAL

## 2025-02-25 SDOH — HEALTH STABILITY: PHYSICAL HEALTH: ON AVERAGE, HOW MANY DAYS PER WEEK DO YOU ENGAGE IN MODERATE TO STRENUOUS EXERCISE (LIKE A BRISK WALK)?: 3 DAYS

## 2025-02-25 SDOH — HEALTH STABILITY: PHYSICAL HEALTH: ON AVERAGE, HOW MANY MINUTES DO YOU ENGAGE IN EXERCISE AT THIS LEVEL?: 30 MIN

## 2025-02-25 ASSESSMENT — SOCIAL DETERMINANTS OF HEALTH (SDOH): HOW OFTEN DO YOU GET TOGETHER WITH FRIENDS OR RELATIVES?: TWICE A WEEK

## 2025-02-26 ENCOUNTER — PATIENT OUTREACH (OUTPATIENT)
Dept: ONCOLOGY | Facility: CLINIC | Age: 35
End: 2025-02-26

## 2025-02-26 ENCOUNTER — OFFICE VISIT (OUTPATIENT)
Dept: FAMILY MEDICINE | Facility: CLINIC | Age: 35
End: 2025-02-26
Payer: COMMERCIAL

## 2025-02-26 VITALS
WEIGHT: 202.4 LBS | BODY MASS INDEX: 35.86 KG/M2 | SYSTOLIC BLOOD PRESSURE: 127 MMHG | RESPIRATION RATE: 16 BRPM | HEIGHT: 63 IN | OXYGEN SATURATION: 99 % | HEART RATE: 79 BPM | DIASTOLIC BLOOD PRESSURE: 84 MMHG | TEMPERATURE: 97.6 F

## 2025-02-26 DIAGNOSIS — E78.2 MIXED HYPERLIPIDEMIA: ICD-10-CM

## 2025-02-26 DIAGNOSIS — Z80.3 FAMILY HISTORY OF MALIGNANT NEOPLASM OF BREAST: ICD-10-CM

## 2025-02-26 DIAGNOSIS — Z00.00 ROUTINE GENERAL MEDICAL EXAMINATION AT A HEALTH CARE FACILITY: Primary | ICD-10-CM

## 2025-02-26 DIAGNOSIS — Z87.59 HISTORY OF GESTATIONAL HYPERTENSION: ICD-10-CM

## 2025-02-26 PROCEDURE — 36415 COLL VENOUS BLD VENIPUNCTURE: CPT | Performed by: FAMILY MEDICINE

## 2025-02-26 NOTE — PATIENT INSTRUCTIONS
Patient Education   Preventive Care Advice   This is general advice given by our system to help you stay healthy. However, your care team may have specific advice just for you. Please talk to your care team about your preventive care needs.  Nutrition  Eat 5 or more servings of fruits and vegetables each day.  Try wheat bread, brown rice and whole grain pasta (instead of white bread, rice, and pasta).  Get enough calcium and vitamin D. Check the label on foods and aim for 100% of the RDA (recommended daily allowance).  Lifestyle  Exercise at least 150 minutes each week  (30 minutes a day, 5 days a week).  Do muscle strengthening activities 2 days a week. These help control your weight and prevent disease.  No smoking.  Wear sunscreen to prevent skin cancer.  Have a dental exam and cleaning every 6 months.  Yearly exams  See your health care team every year to talk about:  Any changes in your health.  Any medicines your care team has prescribed.  Preventive care, family planning, and ways to prevent chronic diseases.  Shots (vaccines)   HPV shots (up to age 26), if you've never had them before.  Hepatitis B shots (up to age 59), if you've never had them before.  COVID-19 shot: Get this shot when it's due.  Flu shot: Get a flu shot every year.  Tetanus shot: Get a tetanus shot every 10 years.  Pneumococcal, hepatitis A, and RSV shots: Ask your care team if you need these based on your risk.  Shingles shot (for age 50 and up)  General health tests  Diabetes screening:  Starting at age 35, Get screened for diabetes at least every 3 years.  If you are younger than age 35, ask your care team if you should be screened for diabetes.  Cholesterol test: At age 39, start having a cholesterol test every 5 years, or more often if advised.  Bone density scan (DEXA): At age 50, ask your care team if you should have this scan for osteoporosis (brittle bones).  Hepatitis C: Get tested at least once in your life.  STIs (sexually  transmitted infections)  Before age 24: Ask your care team if you should be screened for STIs.  After age 24: Get screened for STIs if you're at risk. You are at risk for STIs (including HIV) if:  You are sexually active with more than one person.  You don't use condoms every time.  You or a partner was diagnosed with a sexually transmitted infection.  If you are at risk for HIV, ask about PrEP medicine to prevent HIV.  Get tested for HIV at least once in your life, whether you are at risk for HIV or not.  Cancer screening tests  Cervical cancer screening: If you have a cervix, begin getting regular cervical cancer screening tests starting at age 21.  Breast cancer scan (mammogram): If you've ever had breasts, begin having regular mammograms starting at age 40. This is a scan to check for breast cancer.  Colon cancer screening: It is important to start screening for colon cancer at age 45.  Have a colonoscopy test every 10 years (or more often if you're at risk) Or, ask your provider about stool tests like a FIT test every year or Cologuard test every 3 years.  To learn more about your testing options, visit:   .  For help making a decision, visit:   https://bit.ly/sg08696.  Prostate cancer screening test: If you have a prostate, ask your care team if a prostate cancer screening test (PSA) at age 55 is right for you.  Lung cancer screening: If you are a current or former smoker ages 50 to 80, ask your care team if ongoing lung cancer screenings are right for you.  For informational purposes only. Not to replace the advice of your health care provider. Copyright   2023 [facility]. All rights reserved. Clinically reviewed by the [facility]. [employer] 545501 - REV 01/24.  Learning About Stress  What is stress?     Stress is your body's response to a hard situation. Your body can have a physical, emotional, or mental response. Stress is a fact of life for most people, and it  affects everyone differently. What causes stress for you may not be stressful for someone else.  A lot of things can cause stress. You may feel stress when you go on a job interview, take a test, or run a race. This kind of short-term stress is normal and even useful. It can help you if you need to work hard or react quickly. For example, stress can help you finish an important job on time.  Long-term stress is caused by ongoing stressful situations or events. Examples of long-term stress include long-term health problems, ongoing problems at work, or conflicts in your family. Long-term stress can harm your health.  How does stress affect your health?  When you are stressed, your body responds as though you are in danger. It makes hormones that speed up your heart, make you breathe faster, and give you a burst of energy. This is called the fight-or-flight stress response. If the stress is over quickly, your body goes back to normal and no harm is done.  But if stress happens too often or lasts too long, it can have bad effects. Long-term stress can make you more likely to get sick, and it can make symptoms of some diseases worse. If you tense up when you are stressed, you may develop neck, shoulder, or low back pain. Stress is linked to high blood pressure and heart disease.  Stress also harms your emotional health. It can make you gil, tense, or depressed. Your relationships may suffer, and you may not do well at work or school.  What can you do to manage stress?  You can try these things to help manage stress:   Do something active. Exercise or activity can help reduce stress. Walking is a great way to get started. Even everyday activities such as housecleaning or yard work can help.  Try yoga or tessa chi. These techniques combine exercise and meditation. You may need some training at first to learn them.  Do something you enjoy. For example, listen to music or go to a movie. Practice your hobby or do volunteer  "work.  Meditate. This can help you relax, because you are not worrying about what happened before or what may happen in the future.  Do guided imagery. Imagine yourself in any setting that helps you feel calm. You can use online videos, books, or a teacher to guide you.  Do breathing exercises. For example:  From a standing position, bend forward from the waist with your knees slightly bent. Let your arms dangle close to the floor.  Breathe in slowly and deeply as you return to a standing position. Roll up slowly and lift your head last.  Hold your breath for just a few seconds in the standing position.  Breathe out slowly and bend forward from the waist.  Let your feelings out. Talk, laugh, cry, and express anger when you need to. Talking with supportive friends or family, a counselor, or a matilda leader about your feelings is a healthy way to relieve stress. Avoid discussing your feelings with people who make you feel worse.  Write. It may help to write about things that are bothering you. This helps you find out how much stress you feel and what is causing it. When you know this, you can find better ways to cope.  What can you do to prevent stress?  You might try some of these things to help prevent stress:  Manage your time. This helps you find time to do the things you want and need to do.  Get enough sleep. Your body recovers from the stresses of the day while you are sleeping.  Get support. Your family, friends, and community can make a difference in how you experience stress.  Limit your news feed. Avoid or limit time on social media or news that may make you feel stressed.  Do something active. Exercise or activity can help reduce stress. Walking is a great way to get started.  Where can you learn more?  Go to https://www.[employer].net/patiented  Enter N032 in the search box to learn more about \"Learning About Stress.\"  Current as of: October 24, 2023  Content Version: 14.3    2024 [employer]. "   Care instructions adapted under license by your healthcare professional. If you have questions about a medical condition or this instruction, always ask your healthcare professional. [employer], [employer] disclaims any warranty or liability for your use of this information.

## 2025-02-26 NOTE — PROGRESS NOTES
Writer received referral to Cancer Risk Management/Genetic Counseling.    Referred for:      Family history of malignant neoplasm of breast       Referred By    Provider Department Location Phone   Lacey Koroma DO [facility] [phone]       Referral reviewed for appropriate plan, and sent to New Patient Scheduling ([phone]) for completion.    Citlalli Martins, RN, BSN  Oncology New Patient Nurse Navigator   [facility]

## 2025-02-26 NOTE — PROGRESS NOTES
"Preventive Care Visit  [facility]  Lacey DO [place], Family Medicine  2025      Assessment & Plan     Routine general medical examination at a health care facility  Counseling  Appropriate preventive services were addressed with this patient via screening, questionnaire, or discussion as appropriate for fall prevention, nutrition, physical activity, social engagement, weight loss and cognition.  Checklist reviewing preventive services available has been given to the patient.  Reviewed patient's diet, addressing concerns and/or questions.   She is at risk for lack of exercise and has been provided with information to increase physical activity for the benefit of her well-being.   She is at risk for psychosocial distress and has been provided with information to reduce risk.     Mixed hyperlipidemia  Reassess nonfasting labs.  Discussed dietary/exercise changes.  She will notify us if interested in nutrition referral.  - Lipid Profile (Chol, Trig, HDL, LDL calc); Future    Family history of malignant neoplasm of breast  Maternal grandmother with history of breast cancer,  in her 60s.  Patient reports other family members on maternal side with various forms of cancer as well.  Discussed genetics referral which she is interested in to estimate her risk and determine if early screenings are warranted.  - Adult Genetics & Metabolism  Referral; Future    History of gestational hypertension  Reviewed appropriate A1c 5.4% from 2024.  Plan to repeat annually.    Patient has been advised of split billing requirements and indicates understanding: Yes        BMI  Estimated body mass index is 36.32 kg/m  as calculated from the following:    Height as of this encounter: 1.59 m (5' 2.6\").    Weight as of this encounter: 91.8 kg (202 lb 6.4 oz).   Weight management plan: Discussed healthy diet and exercise guidelines      The longitudinal plan of care for the " diagnosis(es)/condition(s) as documented were addressed during this visit. Due to the added complexity in care, I will continue to support Hali in the subsequent management and with ongoing continuity of care.      Subjective   Hali is a 34 year old, presenting for the following:  Annual Visit (Not fasting)        2/26/2025     1:11 PM   Additional Questions   Roomed by Jennifer LITTLE CMA   Accompanied by self          HPI    MGM passed from breast cancer in early 60s  Mom's aunt ovarian cancer      Health Care Directive  Patient does not have a Health Care Directive: Discussed advance care planning with patient; however, patient declined at this time.      2/25/2025   General Health   How would you rate your overall physical health? Good   Feel stress (tense, anxious, or unable to sleep) To some extent   (!) STRESS CONCERN      2/25/2025   Nutrition   Three or more servings of calcium each day? Yes   Diet: Regular (no restrictions)   How many servings of fruit and vegetables per day? (!) 2-3   How many sweetened beverages each day? 0-1         2/25/2025   Exercise   Days per week of moderate/strenous exercise 3 days   Average minutes spent exercising at this level 30 min         2/25/2025   Social Factors   Frequency of gathering with friends or relatives Twice a week   Worry food won't last until get money to buy more No   Food not last or not have enough money for food? No   Do you have housing? (Housing is defined as stable permanent housing and does not include staying ouside in a car, in a tent, in an abandoned building, in an overnight shelter, or couch-surfing.) Yes   Are you worried about losing your housing? No   Lack of transportation? No   Unable to get utilities (heat,electricity)? No         2/25/2025   Dental   Dentist two times every year? Yes            Today's PHQ-2 Score:       2/25/2025     2:40 PM   PHQ-2 ( 1999 [employer])   Q1: Little interest or pleasure in doing things 0   Q2: Feeling down,  "depressed or hopeless 0   PHQ-2 Score 0    Q1: Little interest or pleasure in doing things Not at all   Q2: Feeling down, depressed or hopeless Not at all   PHQ-2 Score 0       Patient-reported           2/25/2025   Substance Use   Alcohol more than 3/day or more than 7/wk No   Do you use any other substances recreationally? No     Social History     Tobacco Use    Smoking status: Never     Passive exposure: Yes    Smokeless tobacco: Never   Substance Use Topics    Alcohol use: Yes     Alcohol/week: 2.0 - 6.0 standard drinks of alcohol     Comment: occ    Drug use: No           4/3/2023   LAST FHS-7 RESULTS   1st degree relative breast or ovarian cancer No   Any relative bilateral breast cancer No   Any male have breast cancer No   Any ONE woman have BOTH breast AND ovarian cancer Yes   Any woman with breast cancer before 50yrs No   2 or more relatives with breast AND/OR ovarian cancer No   2 or more relatives with breast AND/OR bowel cancer No        Mammogram Screening - Patient under 40 years of age: Routine Mammogram Screening not recommended.         2/25/2025   STI Screening   New sexual partner(s) since last STI/HIV test? No     History of abnormal Pap smear: No - age 30-64 HPV with reflex Pap every 5 years recommended        Latest Ref Rng & Units 11/1/2023     2:40 PM 1/4/2021     4:21 PM 1/4/2021     4:15 PM   PAP / HPV   PAP  Negative for Intraepithelial Lesion or Malignancy (NILM)      PAP (Historical)   NIL     HPV 16 DNA NEG^Negative   Negative    HPV 18 DNA NEG^Negative   Negative    Other HR HPV NEG^Negative   Negative            2/25/2025   Contraception/Family Planning   Questions about contraception or family planning No        Reviewed and updated as needed this visit by Provider                       Objective    Exam  /84   Pulse 79   Temp 97.6  F (36.4  C) (Oral)   Resp 16   Ht 1.59 m (5' 2.6\")   Wt 91.8 kg (202 lb 6.4 oz)   LMP 02/12/2025 (Exact Date)   SpO2 99%   BMI 36.32 " "kg/m     Estimated body mass index is 36.32 kg/m  as calculated from the following:    Height as of this encounter: 1.59 m (5' 2.6\").    Weight as of this encounter: 91.8 kg (202 lb 6.4 oz).    Physical Exam  GENERAL: alert and no distress  EYES: Eyes grossly normal to inspection, PERRL and conjunctivae and sclerae normal  HENT: ear canals and TM's normal, nose and mouth without ulcers or lesions  NECK: no adenopathy, no asymmetry, masses, or scars  RESP: lungs clear to auscultation - no rales, rhonchi or wheezes  CV: regular rate and rhythm, normal S1 S2, no S3 or S4, no murmur, click or rub, no peripheral edema  ABDOMEN: soft, nontender  MS: no gross musculoskeletal defects noted, no edema  SKIN: no suspicious lesions or rashes  NEURO: Normal strength and tone, mentation intact and speech normal  PSYCH: mentation appears normal, affect normal/bright        Signed Electronically by: Lacey Koroma,     "

## 2025-02-27 LAB
CHOLEST SERPL-MCNC: 260 MG/DL
FASTING STATUS PATIENT QL REPORTED: NO
HDLC SERPL-MCNC: 40 MG/DL
LDLC SERPL CALC-MCNC: 152 MG/DL
NONHDLC SERPL-MCNC: 220 MG/DL
TRIGL SERPL-MCNC: 340 MG/DL

## 2025-03-03 ENCOUNTER — MYC MEDICAL ADVICE (OUTPATIENT)
Dept: FAMILY MEDICINE | Facility: CLINIC | Age: 35
End: 2025-03-03
Payer: COMMERCIAL

## 2025-03-03 DIAGNOSIS — E78.2 MIXED HYPERLIPIDEMIA: Primary | ICD-10-CM

## 2025-06-30 ENCOUNTER — LAB (OUTPATIENT)
Dept: LAB | Facility: CLINIC | Age: 35
End: 2025-06-30
Payer: COMMERCIAL

## 2025-06-30 DIAGNOSIS — E78.2 MIXED HYPERLIPIDEMIA: ICD-10-CM

## 2025-06-30 LAB
CHOLEST SERPL-MCNC: 219 MG/DL
FASTING STATUS PATIENT QL REPORTED: YES
HDLC SERPL-MCNC: 35 MG/DL
LDLC SERPL CALC-MCNC: 167 MG/DL
NONHDLC SERPL-MCNC: 184 MG/DL
TRIGL SERPL-MCNC: 84 MG/DL

## 2025-06-30 PROCEDURE — 36415 COLL VENOUS BLD VENIPUNCTURE: CPT

## 2025-06-30 PROCEDURE — 80061 LIPID PANEL: CPT

## 2025-07-01 ENCOUNTER — RESULTS FOLLOW-UP (OUTPATIENT)
Dept: FAMILY MEDICINE | Facility: CLINIC | Age: 35
End: 2025-07-01

## 2025-08-06 ENCOUNTER — VIRTUAL VISIT (OUTPATIENT)
Dept: ONCOLOGY | Facility: CLINIC | Age: 35
End: 2025-08-06
Attending: FAMILY MEDICINE
Payer: COMMERCIAL

## 2025-08-06 DIAGNOSIS — Z80.0 FAMILY HISTORY OF MALIGNANT NEOPLASM OF COLON: ICD-10-CM

## 2025-08-06 DIAGNOSIS — Z80.3 FAMILY HISTORY OF MALIGNANT NEOPLASM OF BREAST: Primary | ICD-10-CM

## 2025-08-06 DIAGNOSIS — Z80.0 FAMILY HISTORY OF MALIGNANT NEOPLASM OF PANCREAS: ICD-10-CM

## 2025-08-06 DIAGNOSIS — Z80.41 FAMILY HISTORY OF MALIGNANT NEOPLASM OF OVARY: ICD-10-CM
